# Patient Record
Sex: FEMALE | Race: WHITE | NOT HISPANIC OR LATINO | Employment: OTHER | URBAN - METROPOLITAN AREA
[De-identification: names, ages, dates, MRNs, and addresses within clinical notes are randomized per-mention and may not be internally consistent; named-entity substitution may affect disease eponyms.]

---

## 2017-08-01 ENCOUNTER — HOSPITAL ENCOUNTER (INPATIENT)
Facility: CLINIC | Age: 62
LOS: 5 days | Discharge: HOME | DRG: 580 | End: 2017-08-06
Attending: EMERGENCY MEDICINE | Admitting: INTERNAL MEDICINE
Payer: COMMERCIAL

## 2017-08-01 ENCOUNTER — APPOINTMENT (OUTPATIENT)
Dept: GENERAL RADIOLOGY | Facility: CLINIC | Age: 62
DRG: 580 | End: 2017-08-01
Attending: EMERGENCY MEDICINE
Payer: COMMERCIAL

## 2017-08-01 DIAGNOSIS — L03.818 CELLULITIS OF OTHER SPECIFIED SITE: Primary | ICD-10-CM

## 2017-08-01 DIAGNOSIS — S51.052A DOG BITE OF LEFT ELBOW, INITIAL ENCOUNTER: ICD-10-CM
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
## 2017-08-01 DIAGNOSIS — K59.00 CONSTIPATION, UNSPECIFIED CONSTIPATION TYPE: ICD-10-CM

## 2017-08-01 DIAGNOSIS — M00.9 INFECTION OF LEFT ELBOW (H): ICD-10-CM

## 2017-08-01 PROBLEM — L03.90 CELLULITIS: Status: ACTIVE | Noted: 2017-08-01

## 2017-08-01 LAB
ANION GAP SERPL CALCULATED.3IONS-SCNC: 9 MMOL/L (ref 3–14)
BASOPHILS # BLD AUTO: 0 10E9/L (ref 0–0.2)
BASOPHILS NFR BLD AUTO: 0.4 %
BUN SERPL-MCNC: 7 MG/DL (ref 7–30)
CALCIUM SERPL-MCNC: 9.2 MG/DL (ref 8.5–10.1)
CHLORIDE SERPL-SCNC: 91 MMOL/L (ref 94–109)
CO2 SERPL-SCNC: 25 MMOL/L (ref 20–32)
CREAT SERPL-MCNC: 0.6 MG/DL (ref 0.52–1.04)
CRP SERPL-MCNC: 156 MG/L (ref 0–8)
DIFFERENTIAL METHOD BLD: NORMAL
EOSINOPHIL # BLD AUTO: 0.1 10E9/L (ref 0–0.7)
EOSINOPHIL NFR BLD AUTO: 0.7 %
ERYTHROCYTE [DISTWIDTH] IN BLOOD BY AUTOMATED COUNT: 14.1 % (ref 10–15)
ERYTHROCYTE [SEDIMENTATION RATE] IN BLOOD BY WESTERGREN METHOD: 12 MM/H (ref 0–30)
GFR SERPL CREATININE-BSD FRML MDRD: ABNORMAL ML/MIN/1.7M2
GLUCOSE SERPL-MCNC: 92 MG/DL (ref 70–99)
HCT VFR BLD AUTO: 42 % (ref 35–47)
HGB BLD-MCNC: 14.2 G/DL (ref 11.7–15.7)
IMM GRANULOCYTES # BLD: 0.1 10E9/L (ref 0–0.4)
IMM GRANULOCYTES NFR BLD: 0.5 %
LYMPHOCYTES # BLD AUTO: 1.9 10E9/L (ref 0.8–5.3)
LYMPHOCYTES NFR BLD AUTO: 18.4 %
MCH RBC QN AUTO: 30.5 PG (ref 26.5–33)
MCHC RBC AUTO-ENTMCNC: 33.8 G/DL (ref 31.5–36.5)
MCV RBC AUTO: 90 FL (ref 78–100)
MONOCYTES # BLD AUTO: 0.7 10E9/L (ref 0–1.3)
MONOCYTES NFR BLD AUTO: 6.5 %
NEUTROPHILS # BLD AUTO: 7.8 10E9/L (ref 1.6–8.3)
NEUTROPHILS NFR BLD AUTO: 73.5 %
NRBC # BLD AUTO: 0 10*3/UL
NRBC BLD AUTO-RTO: 0 /100
PLATELET # BLD AUTO: 244 10E9/L (ref 150–450)
POTASSIUM SERPL-SCNC: 4.3 MMOL/L (ref 3.4–5.3)
RBC # BLD AUTO: 4.66 10E12/L (ref 3.8–5.2)
SODIUM SERPL-SCNC: 125 MMOL/L (ref 133–144)
WBC # BLD AUTO: 10.5 10E9/L (ref 4–11)

## 2017-08-01 PROCEDURE — 85652 RBC SED RATE AUTOMATED: CPT | Performed by: EMERGENCY MEDICINE

## 2017-08-01 PROCEDURE — 25000132 ZZH RX MED GY IP 250 OP 250 PS 637: Performed by: INTERNAL MEDICINE

## 2017-08-01 PROCEDURE — 96361 HYDRATE IV INFUSION ADD-ON: CPT

## 2017-08-01 PROCEDURE — 99207 ZZC CDG-MDM COMPONENT: MEETS LOW - DOWN CODED: CPT | Performed by: INTERNAL MEDICINE

## 2017-08-01 PROCEDURE — 87040 BLOOD CULTURE FOR BACTERIA: CPT | Performed by: EMERGENCY MEDICINE

## 2017-08-01 PROCEDURE — 25000128 H RX IP 250 OP 636: Performed by: INTERNAL MEDICINE

## 2017-08-01 PROCEDURE — 80048 BASIC METABOLIC PNL TOTAL CA: CPT | Performed by: EMERGENCY MEDICINE

## 2017-08-01 PROCEDURE — 86140 C-REACTIVE PROTEIN: CPT | Performed by: EMERGENCY MEDICINE

## 2017-08-01 PROCEDURE — 85025 COMPLETE CBC W/AUTO DIFF WBC: CPT | Performed by: EMERGENCY MEDICINE

## 2017-08-01 PROCEDURE — 25000125 ZZHC RX 250: Performed by: INTERNAL MEDICINE

## 2017-08-01 PROCEDURE — 96365 THER/PROPH/DIAG IV INF INIT: CPT

## 2017-08-01 PROCEDURE — 25000128 H RX IP 250 OP 636: Performed by: EMERGENCY MEDICINE

## 2017-08-01 PROCEDURE — 73080 X-RAY EXAM OF ELBOW: CPT | Mod: LT

## 2017-08-01 PROCEDURE — 96375 TX/PRO/DX INJ NEW DRUG ADDON: CPT

## 2017-08-01 PROCEDURE — 99285 EMERGENCY DEPT VISIT HI MDM: CPT | Mod: 25

## 2017-08-01 PROCEDURE — 12000011 ZZH R&B MS OVERFLOW

## 2017-08-01 PROCEDURE — 96376 TX/PRO/DX INJ SAME DRUG ADON: CPT

## 2017-08-01 PROCEDURE — 99222 1ST HOSP IP/OBS MODERATE 55: CPT | Mod: AI | Performed by: INTERNAL MEDICINE

## 2017-08-01 RX ORDER — HYDROMORPHONE HCL/0.9% NACL/PF 0.2MG/0.2
0.2 SYRINGE (ML) INTRAVENOUS
Status: DISCONTINUED | OUTPATIENT
Start: 2017-08-01 | End: 2017-08-03

## 2017-08-01 RX ORDER — HYDROCODONE BITARTRATE AND ACETAMINOPHEN 5; 325 MG/1; MG/1
1-2 TABLET ORAL EVERY 4 HOURS PRN
Status: ON HOLD | COMMUNITY
End: 2017-08-06

## 2017-08-01 RX ORDER — ACETAMINOPHEN 325 MG/1
650 TABLET ORAL EVERY 4 HOURS PRN
Status: DISCONTINUED | OUTPATIENT
Start: 2017-08-01 | End: 2017-08-03

## 2017-08-01 RX ORDER — PROCHLORPERAZINE 25 MG
25 SUPPOSITORY, RECTAL RECTAL EVERY 12 HOURS PRN
Status: DISCONTINUED | OUTPATIENT
Start: 2017-08-01 | End: 2017-08-03

## 2017-08-01 RX ORDER — GABAPENTIN 300 MG/1
300 CAPSULE ORAL 3 TIMES DAILY
Status: DISCONTINUED | OUTPATIENT
Start: 2017-08-01 | End: 2017-08-03

## 2017-08-01 RX ORDER — ONDANSETRON 4 MG/1
4 TABLET, ORALLY DISINTEGRATING ORAL EVERY 6 HOURS PRN
Status: DISCONTINUED | OUTPATIENT
Start: 2017-08-01 | End: 2017-08-03

## 2017-08-01 RX ORDER — FLUTICASONE PROPIONATE 50 MCG
1 SPRAY, SUSPENSION (ML) NASAL DAILY PRN
Status: DISCONTINUED | OUTPATIENT
Start: 2017-08-01 | End: 2017-08-03

## 2017-08-01 RX ORDER — MULTIVITAMIN,THERAPEUTIC
1 TABLET ORAL DAILY
Status: DISCONTINUED | OUTPATIENT
Start: 2017-08-01 | End: 2017-08-03

## 2017-08-01 RX ORDER — AMPICILLIN AND SULBACTAM 1; .5 G/1; G/1
1.5 INJECTION, POWDER, FOR SOLUTION INTRAMUSCULAR; INTRAVENOUS ONCE
Status: COMPLETED | OUTPATIENT
Start: 2017-08-01 | End: 2017-08-01

## 2017-08-01 RX ORDER — SERTRALINE HYDROCHLORIDE 100 MG/1
150 TABLET, FILM COATED ORAL DAILY
COMMUNITY

## 2017-08-01 RX ORDER — AMPICILLIN AND SULBACTAM 2; 1 G/1; G/1
3 INJECTION, POWDER, FOR SOLUTION INTRAMUSCULAR; INTRAVENOUS EVERY 6 HOURS
Status: DISCONTINUED | OUTPATIENT
Start: 2017-08-02 | End: 2017-08-03

## 2017-08-01 RX ORDER — HYDROMORPHONE HYDROCHLORIDE 1 MG/ML
0.5 INJECTION, SOLUTION INTRAMUSCULAR; INTRAVENOUS; SUBCUTANEOUS
Status: COMPLETED | OUTPATIENT
Start: 2017-08-01 | End: 2017-08-01

## 2017-08-01 RX ORDER — LISINOPRIL 10 MG/1
10 TABLET ORAL DAILY
Status: DISCONTINUED | OUTPATIENT
Start: 2017-08-02 | End: 2017-08-03

## 2017-08-01 RX ORDER — AMOXICILLIN 250 MG
1-2 CAPSULE ORAL 2 TIMES DAILY PRN
Status: DISCONTINUED | OUTPATIENT
Start: 2017-08-01 | End: 2017-08-03

## 2017-08-01 RX ORDER — BISACODYL 10 MG
10 SUPPOSITORY, RECTAL RECTAL DAILY PRN
Status: DISCONTINUED | OUTPATIENT
Start: 2017-08-01 | End: 2017-08-03

## 2017-08-01 RX ORDER — GABAPENTIN 300 MG/1
300 CAPSULE ORAL 3 TIMES DAILY
COMMUNITY

## 2017-08-01 RX ORDER — PROCHLORPERAZINE MALEATE 5 MG
5-10 TABLET ORAL EVERY 6 HOURS PRN
Status: DISCONTINUED | OUTPATIENT
Start: 2017-08-01 | End: 2017-08-03

## 2017-08-01 RX ORDER — NALOXONE HYDROCHLORIDE 0.4 MG/ML
.1-.4 INJECTION, SOLUTION INTRAMUSCULAR; INTRAVENOUS; SUBCUTANEOUS
Status: DISCONTINUED | OUTPATIENT
Start: 2017-08-01 | End: 2017-08-03

## 2017-08-01 RX ORDER — SODIUM CHLORIDE 9 MG/ML
1000 INJECTION, SOLUTION INTRAVENOUS CONTINUOUS
Status: DISCONTINUED | OUTPATIENT
Start: 2017-08-01 | End: 2017-08-01

## 2017-08-01 RX ORDER — NICOTINE 21 MG/24HR
1 PATCH, TRANSDERMAL 24 HOURS TRANSDERMAL DAILY
Status: DISCONTINUED | OUTPATIENT
Start: 2017-08-01 | End: 2017-08-03

## 2017-08-01 RX ORDER — OXYCODONE HYDROCHLORIDE 5 MG/1
5-10 TABLET ORAL
Status: DISCONTINUED | OUTPATIENT
Start: 2017-08-01 | End: 2017-08-03

## 2017-08-01 RX ORDER — FOLIC ACID 1 MG/1
1 TABLET ORAL DAILY
Status: DISCONTINUED | OUTPATIENT
Start: 2017-08-01 | End: 2017-08-03

## 2017-08-01 RX ORDER — ONDANSETRON 2 MG/ML
4 INJECTION INTRAMUSCULAR; INTRAVENOUS EVERY 6 HOURS PRN
Status: DISCONTINUED | OUTPATIENT
Start: 2017-08-01 | End: 2017-08-03

## 2017-08-01 RX ORDER — LANOLIN ALCOHOL/MO/W.PET/CERES
100 CREAM (GRAM) TOPICAL DAILY
Status: DISCONTINUED | OUTPATIENT
Start: 2017-08-01 | End: 2017-08-03

## 2017-08-01 RX ORDER — KETOROLAC TROMETHAMINE 15 MG/ML
15 INJECTION, SOLUTION INTRAMUSCULAR; INTRAVENOUS ONCE
Status: COMPLETED | OUTPATIENT
Start: 2017-08-01 | End: 2017-08-01

## 2017-08-01 RX ORDER — POLYETHYLENE GLYCOL 3350 17 G/17G
17 POWDER, FOR SOLUTION ORAL DAILY PRN
Status: DISCONTINUED | OUTPATIENT
Start: 2017-08-01 | End: 2017-08-03

## 2017-08-01 RX ORDER — FLUTICASONE PROPIONATE 50 MCG
1 SPRAY, SUSPENSION (ML) NASAL DAILY PRN
COMMUNITY
End: 2020-04-21

## 2017-08-01 RX ORDER — SODIUM CHLORIDE 9 MG/ML
INJECTION, SOLUTION INTRAVENOUS CONTINUOUS
Status: DISCONTINUED | OUTPATIENT
Start: 2017-08-01 | End: 2017-08-02

## 2017-08-01 RX ORDER — LISINOPRIL 10 MG/1
10 TABLET ORAL DAILY
COMMUNITY

## 2017-08-01 RX ADMIN — AMPICILLIN SODIUM AND SULBACTAM SODIUM 3 G: 2; 1 INJECTION, POWDER, FOR SOLUTION INTRAMUSCULAR; INTRAVENOUS at 23:42

## 2017-08-01 RX ADMIN — KETOROLAC TROMETHAMINE 15 MG: 15 INJECTION, SOLUTION INTRAMUSCULAR; INTRAVENOUS at 16:00

## 2017-08-01 RX ADMIN — AMPICILLIN SODIUM AND SULBACTAM SODIUM 1.5 G: 1; .5 INJECTION, POWDER, FOR SOLUTION INTRAMUSCULAR; INTRAVENOUS at 18:14

## 2017-08-01 RX ADMIN — NICOTINE 1 PATCH: 21 PATCH, EXTENDED RELEASE TRANSDERMAL at 19:51

## 2017-08-01 RX ADMIN — SODIUM CHLORIDE 1000 ML: 9 INJECTION, SOLUTION INTRAVENOUS at 15:59

## 2017-08-01 RX ADMIN — HYDROMORPHONE HYDROCHLORIDE 0.5 MG: 1 INJECTION, SOLUTION INTRAMUSCULAR; INTRAVENOUS; SUBCUTANEOUS at 18:14

## 2017-08-01 RX ADMIN — HYDROMORPHONE HYDROCHLORIDE 0.5 MG: 1 INJECTION, SOLUTION INTRAMUSCULAR; INTRAVENOUS; SUBCUTANEOUS at 16:00

## 2017-08-01 RX ADMIN — OXYCODONE HYDROCHLORIDE 10 MG: 5 TABLET ORAL at 19:52

## 2017-08-01 RX ADMIN — SODIUM CHLORIDE: 9 INJECTION, SOLUTION INTRAVENOUS at 19:05

## 2017-08-01 RX ADMIN — HYDROMORPHONE HYDROCHLORIDE 0.5 MG: 1 INJECTION, SOLUTION INTRAMUSCULAR; INTRAVENOUS; SUBCUTANEOUS at 16:45

## 2017-08-01 RX ADMIN — GABAPENTIN 300 MG: 300 CAPSULE ORAL at 19:50

## 2017-08-01 ASSESSMENT — ENCOUNTER SYMPTOMS
FEVER: 0
WOUND: 1
NAUSEA: 0
COLOR CHANGE: 1
VOMITING: 0

## 2017-08-01 NOTE — PHARMACY-ADMISSION MEDICATION HISTORY
Admission medication history interview status for this patient is complete. See [facility] admission navigator for allergy information, prior to admission medications and immunization status.     Medication history interview source(s):Patient  Medication history resources (including written lists, pill bottles, clinic record):None, used Care everywhere  Primary pharmacy:[facility]    Changes made to PTA medication list:  Added: flonase, gabapentin, lisinopril, zoloft,   Deleted: -  Changed: norco to 1-2 tabs q4h prn    Actions taken by pharmacist (provider contacted, etc):None     Additional medication history information:None    Medication reconciliation/reorder completed by provider prior to medication history? No    Do you take OTC medications (eg tylenol, ibuprofen, fish oil, eye/ear drops, etc)? No      For patients on insulin therapy: No   Lantus/levemir/NPH/Mix 70/30 dose:   (Y/N) (see below)   Sliding scale Novolog Y/N  If Yes, do you have a baseline novolog pre-meal dose:  units with meals   Patients eat three meals a day:   Y/N     Any Barriers to therapy:  cost of medications/comfortable with giving injections (if applicable)/ comfortable and confident with current diabetes regimen:       Prior to Admission medications    Medication Sig Last Dose Taking? Auth Provider   HYDROcodone-acetaminophen (NORCO) 5-325 MG per tablet Take 1-2 tablets by mouth every 4 hours as needed for moderate to severe pain  8/1/2017 at 0900 Yes Reported, Patient   fluticasone (FLONASE) 50 MCG/ACT spray Spray 1 spray into both nostrils daily as needed for rhinitis or allergies  Yes Unknown, Entered By History   gabapentin (NEURONTIN) 300 MG capsule Take 300 mg by mouth 3 times daily 8/1/2017 at x2 Yes Unknown, Entered By History   lisinopril (PRINIVIL/ZESTRIL) 10 MG tablet Take 10 mg by mouth daily 8/1/2017 at Unknown time Yes Unknown, Entered By History   sertraline (ZOLOFT) 100 MG tablet Take 150 mg by mouth daily . 1.5 tabs daily  8/1/2017 at Unknown time Yes Unknown, Entered By History   amoxicillin-clavulanate (AUGMENTIN) 875-125 MG per tablet Take 1 tablet by mouth 2 times daily For 7 days starting 7/30/17 8/1/2017 at x1 Yes Unknown, Entered By History

## 2017-08-01 NOTE — CONSULTS
Sarah Ulrich was seen in the ER.  She has some drainage from several puncture wounds in the left lateral elbow following a dog bite 72 hours ago.  She has pain free passive motion from  and full sup/pronation and only pain at the ends of the range.  She clearly has a soft tissue infection and cellulitis but I am not convinced she has a septic joint at this point.  It is reasonable to see how she responds to IV antibiotics.  If she worsens of fails to improve would consider I and D of the joint.  May eat for now, NPO after MN..  Roldan Stewart MD, Orthopedic Surgeon, [phone]

## 2017-08-01 NOTE — ED PROVIDER NOTES
"  History     Chief Complaint:  Dog bite    HPI   Sarah Ulrich is a 61 year old, right hand dominant and last tetanus two years ago, female who presents with evaluation for increased redness, pain and swelling from a dog bite over the left elbow.  On 7/29/17 at about 1900, the patient was patting her son's girlfriends dog when the dog suddenly bit her left elbow. She was not able to be seen that day but was seen in urgent care on 7/30/17. Her wounded was cleaned and received antibiotics. No sutures were performed. She was prescribed Augmentin and Norco. Yesterday morning, she noticed that her left elbow has been swelling and an increase redness radiating towards her hand. She states that her elbow felt like \"it has a fever and was very warm\". Otherwise, she denies any fever, nausea, or vomiting. She thinks the dog is up to date with all shots and will confirm with her son's girlfriend.     Imaging results from [facility], July 2017:   XR Left Elbow:   The soft tissue swelling with joint effusion.  No acute fracture.    XR Lumbar Spine:   1. No acute abnormality.  2. Prominent multilevel degenerative disc changes with associated lumbar scoliosis, convex to the right and centered at L3-4.    XR Mammo Bilat:  BI-RADS CATEGORY: 1 -  NEGATIVE.    XR Right Wrist:   Advanced degenerative osteoarthritic changes.  No acute abnormality.    Allergies:  No Known Drug Allergies     Medications:    Norco     Past Medical History:    The patient denies any relevant past medical history.    Past Surgical History:    Biopsy      Family History:    No past pertinent family history.    Social History:  The patient presented to the ED alone.  Smoking Status: No  Smokeless Tobacco: No  Alcohol Use: No  Marital Status:   [2]     Review of Systems   Constitutional: Negative for fever.   Gastrointestinal: Negative for nausea and vomiting.   Skin: Positive for color change (Redness on left elbow) and wound.        Swelling on left elbow " "  All other systems reviewed and are negative.    Physical Exam   Vitals:  Patient Vitals for the past 24 hrs:   BP Temp Temp src Pulse Resp SpO2 Height Weight   08/01/17 1700 123/66 - - - - 96 % - -   08/01/17 1630 136/71 - - - - 98 % - -   08/01/17 1615 127/67 - - - - - - -   08/01/17 1600 132/68 - - - - 97 % - -   08/01/17 1511 136/70 99.3  F (37.4  C) Temporal 101 18 100 % 1.575 m (5' 2\") 48.1 kg (106 lb)     Physical Exam  Constitutional:  Well developed, Well nourished, Completely comfortable    HENT:  Bilateral external ears normal, Mucous membranes moist, Nose normal. Neck- Normal range of motion, Supple  Respiratory:  Normal breath sounds, No respiratory distress, No wheezing,  Cardiovascular:  Normal heart rate, Normal rhythm, No murmurs,    GI:  Bowel sounds normal, Soft, No tenderness,   Musculoskeletal:  Intact distal pulses, No edema, grossly unremarkable range of motion. Left elbow diffuse swelling and flextion fluctuate secondary to pain. Multiple puncture wounds to elbow, at least 1cm long, extend all the way through skin. Arrhythmia elbow to distal form. Erythema elbow to distal form. Radial pulse, distal strength and sensation in tact.   Integument:  Warm, Dry   Neurologic:  Alert, attentive and appropriately oriented  Psychiatric:  Mood and affect normal.           Emergency Department Course     Imaging:  Radiology findings were communicated with the patient who voiced understanding of the findings.    Left Elbow XR   There is an elbow joint effusion. There is associated soft tissue swelling posteriorly. No fractures or radiopaque foreign bodies are identified on these views. Given the clinical history the joint effusion could possibly be related to an infection. Another possibility is that the patient also had some trauma and there could be an occult fracture.  Reading per radiology.    Laboratory:  Laboratory findings were communicated with the patient who voiced understanding of the " findings.  CBC: AWNL (WBC 10.5, HGB 14.2, )  BMP: NA: 125 (L), Chloride: 91 (L), o/w WNL. (Creatnine: 0.60)  Erythrocyte: 12  CRP inflammation: 156.0 (H)  Blood culture: Pending     Interventions:  1600 Toradol 15 mg IV  1814 Dilaudid 0.5 mg IV  1827 Unasyn 100 mL IV    Emergency Department Course:  Nursing notes and vitals reviewed.  I performed an exam of the patient as documented above.   IV was inserted and blood was drawn for laboratory testing, results above.  The patient was sent for a Left Elbow XR while in the emergency department, results above.  IV was inserted and blood was drawn for laboratory testing, results above.    5:23 PM: I spoke with Dr. Stewart ([facility]) regarding patient's presentation, findings, and plan of care.  He will see the patient, then will decide on any plan of operative intervention for possible septic joint    6:29 PM: I spoke with Dr. Rutledge from Hospital[facility] regarding patient's presentation, findings, and plan of care.    I discussed the treatment plan with the patient. They expressed understanding of this plan and consented to admission. I discussed the patient with Dr. Rutledge, who will admit the patient to a monitored bed for further evaluation and treatment.    I personally reviewed the laboratory results with the Patient and answered all related questions prior to admission.    Impression & Plan      Medical Decision Making:  Saarh Ulrich is a 61 year old female who presents for evaluation of a dog bite. The workup here in the ED shows signs of cellulitis and is concerning for possible early septic joint.  No repairable  lacerations, tendon or bone injury.  No signs of foreign body or dog teeth in wound.  Dog is known so will have them observe for signs of rabies; no rabies shots indicated from ED. because of the development of worsening cellulitis despite antibiotics, and the possibility of joint involvement, patient was admitted for further evaluation and treatment.  She  agrees with plan.    Diagnosis:    ICD-10-CM    1. Infection of left elbow (H) M00.9 Blood culture     Blood culture   2. Dog bite of left elbow, initial encounter S51.052A     W54.0XXA      Disposition:  Admission    Scribe Disclosure:  I, Reina Siu, am serving as a scribe at 3:24 PM on 8/1/2017 to document services personally performed by Nicolasa Virk MD, based on my observations and the provider's statements to me.    8/1/2017   [facility] EMERGENCY DEPARTMENT     Nicolasa Virk MD  08/01/17 2226

## 2017-08-01 NOTE — IP AVS SNAPSHOT
[facility]    [address]    [facility] [address]    Phone:  [phone]    Fax:  [phone]                                       After Visit Summary   8/1/2017    Sarah Ulrich    MRN: [id number]           After Visit Summary Signature Page     I have received my discharge instructions, and my questions have been answered. I have discussed any challenges I see with this plan with the nurse or doctor.    ..........................................................................................................................................  Patient/Patient Representative Signature      ..........................................................................................................................................  Patient Representative Print Name and Relationship to Patient    ..................................................               ................................................  Date                                            Time    ..........................................................................................................................................  Reviewed by Signature/Title    ...................................................              ..............................................  Date                                                            Time

## 2017-08-01 NOTE — IP AVS SNAPSHOT
MRN:[id number]                      After Visit Summary   8/1/2017    Sarah Ulrich    MRN: [id number]           Thank you!     Thank you for choosing [facility] for your care. Our goal is always to provide you with excellent care. Hearing back from our patients is one way we can continue to improve our services. Please take a few minutes to complete the written survey that you may receive in the mail after you visit. If you would like to speak to someone directly about your visit please contact Patient Relations at [phone]. Thank you!          Patient Information     Date Of Birth          1955        Designated Caregiver       Most Recent Value    Caregiver    Will someone help with your care after discharge? yes    Name of designated caregiver Jordan    Phone number of caregiver [phone]    Caregiver address [place]      About your hospital stay     You were admitted on:  August 1, 2017 You last received care in the:  [facility]    You were discharged on:  August 6, 2017        Reason for your hospital stay       Dog bite to left elbow causing cellulitis, questionable septic joint. S/p left elbow arthroscopy, I&D, and labs drawn                  Who to Call     For medical emergencies, please call 911.  For non-urgent questions about your medical care, please call your primary care provider or clinic, [phone]  For questions related to your surgery, please call your surgery clinic        Attending Provider     Provider Specialty    Nicolasa Virk MD Emergency Medicine    Bam Silva,  Internal Medicine       Primary Care Provider Office Phone # Fax #    [facility] [place] [facility] [phone]      After Care Instructions     Activity       Your activity upon discharge: May move the elbow, hand, and wrist as tolerated according to pain. Dressings per wound care nurse instructions            Diet       Follow this diet upon  discharge: Regular            Supplies       List the supplies the pt needs to go home.  Wound care supplies as indicated            Wound care and dressings       Instructions to care for your wound at home: ice to area for comfort and dressing changes daily according to wound care nurse instructions.                  Follow-up Appointments     Follow-up and recommended labs and tests        Follow up with Mary Corey PA-C at [facility] next Thursday, 8/10. Call [phone] and ask for Stef MURPHY (with Dr. Stewart) to make appointment.                  Further instructions from your care team        Wound care 2 TIMES DAILY     Comments: Plan of care for wound located on Left Elbow:   1. Remove old dressing spraying with microklenz or water to prevent sticking.   2. Flush wound with microklenz spray and allow it to sit in wound bed for 3 to 5mins.   3. Take a strip of gauze and wick out any extra microklenz.   4. Loosely pack wound with Iodoform strip gauze. Fluff strip gauze into wound bed ensuring not to pack tight to allow new healthy tissue to grow. Approx 4inches for bottom wound and 7inches for top  Wound. As wound heals apply less strip gauze. Ensure a tail of gauze is left outside wound to prevent gauze from slipping into wound and to help wick drainage out.   5. Cover with vaseline gauze to prevent sticking   6. Cover with absorbant dressing such as 4x4 or ABD depending on drainage.   7. Secure with kerlix and tape.   8. Apply ace wrap from wrist to upper bicep, ensuring it is not wrapped to tight, checking for color, motion, sensitivity in hand.   9. Change dressing 2x/day and as needed.            Pending Results     Date and Time Order Name Status Description    8/3/2017 1442 Anaerobic bacterial culture Preliminary     8/3/2017 1441 Fluid Culture Aerobic Bacterial Preliminary     8/3/2017 1439 Anaerobic bacterial culture Preliminary     8/1/2017 1535 Blood culture Preliminary     8/1/2017  "1533 Blood culture Preliminary             Statement of Approval     Ordered          17 0912  I have reviewed and agree with all the recommendations and orders detailed in this document.  EFFECTIVE NOW     Approved and electronically signed by:  Crow Marquez MD             Admission Information     Date & Time Provider Department Dept. Phone    2017 Bma Silva, DO [facility] [phone]      Your Vitals Were     Blood Pressure Pulse Temperature Respirations Height Weight    149/81 80 98.3  F (36.8  C) (Oral) 16 1.575 m (5' 2\") 49.1 kg (108 lb 4.8 oz)    Pulse Oximetry BMI (Body Mass Index)                94% 19.81 kg/m2          MyChart Information     [employer] lets you send messages to your doctor, view your test results, renew your prescriptions, schedule appointments and more. To sign up, go to [email or web address] . Click on \"Log in\" on the left side of the screen, which will take you to the Welcome page. Then click on \"Sign up Now\" on the right side of the page.     You will be asked to enter the access code listed below, as well as some personal information. Please follow the directions to create your username and password.     Your access code is: [email or web address]  Expires: 10/30/2017  3:32 PM     Your access code will  in 90 days. If you need help or a new code, please call your [facility] clinic or [phone].        Care EveryWhere ID     This is your Care EveryWhere ID. This could be used by other organizations to access your [facility] medical records  [id number]        Equal Access to Services     [facility]: Hadii john germain [place], waaxda luqadaha, qaybta kaalmada yeny contreras. So [facility] [phone].    ATENCIÓN: Si habla español, tiene a husain disposición servicios gratuitos de asistencia lingüística. Llame al [phone].    We comply with applicable federal civil rights laws and [place] laws. We do not " discriminate on the basis of race, color, national origin, age, disability sex, sexual orientation or gender identity.               Review of your medicines      START taking        Dose / Directions    acetaminophen 325 MG tablet   Commonly known as:  TYLENOL   Used for:  Infection of left elbow (H)        Dose:  650 mg   Take 2 tablets (650 mg) by mouth every 4 hours as needed for other (surgical pain)   Quantity:  100 tablet   Refills:  0       hydrOXYzine 25 MG tablet   Commonly known as:  ATARAX   Used for:  Infection of left elbow (H)        Dose:  25 mg   Take 1 tablet (25 mg) by mouth every 6 hours as needed for itching   Quantity:  30 tablet   Refills:  0       oxyCODONE 5 MG IR tablet   Commonly known as:  ROXICODONE   Used for:  Infection of left elbow (H)        Dose:  5-10 mg   Take 1-2 tablets (5-10 mg) by mouth every 4 hours as needed for moderate to severe pain   Quantity:  40 tablet   Refills:  0       polyethylene glycol Packet   Commonly known as:  MIRALAX/GLYCOLAX   Used for:  Constipation, unspecified constipation type   Notes to Patient:  Start if no longer having loose stools.        Dose:  1 packet   Take 17 g by mouth daily for 24 days   Quantity:  24 packet   Refills:  0         CONTINUE these medicines which may have CHANGED, or have new prescriptions. If we are uncertain of the size of tablets/capsules you have at home, strength may be listed as something that might have changed.        Dose / Directions    amoxicillin-clavulanate 875-125 MG per tablet   Commonly known as:  AUGMENTIN   This may have changed:  additional instructions   Used for:  Dog bite of left elbow, initial encounter, Infection of left elbow (H)   Notes to Patient:  Take one dose today, then start with 2 doses morning and evening tomorrow.        Dose:  1 tablet   Take 1 tablet by mouth 2 times daily for 10 days   Quantity:  20 tablet   Refills:  0         CONTINUE these medicines which have NOT CHANGED        Dose /  Directions    fluticasone 50 MCG/ACT spray   Commonly known as:  FLONASE        Dose:  1 spray   Spray 1 spray into both nostrils daily as needed for rhinitis or allergies   Refills:  0       gabapentin 300 MG capsule   Commonly known as:  NEURONTIN        Dose:  300 mg   Take 300 mg by mouth 3 times daily   Refills:  0       lisinopril 10 MG tablet   Commonly known as:  PRINIVIL/ZESTRIL        Dose:  10 mg   Take 10 mg by mouth daily   Refills:  0       sertraline 100 MG tablet   Commonly known as:  ZOLOFT        Dose:  150 mg   Take 150 mg by mouth daily . 1.5 tabs daily   Refills:  0         STOP taking     HYDROcodone-acetaminophen 5-325 MG per tablet   Commonly known as:  NORCO                Where to get your medicines      These medications were sent to [place], [place] - [address] [facility]  [address] [facility] [address]     Phone:  [phone]     acetaminophen 325 MG tablet    amoxicillin-clavulanate 875-125 MG per tablet    hydrOXYzine 25 MG tablet    polyethylene glycol Packet         Some of these will need a paper prescription and others can be bought over the counter. Ask your nurse if you have questions.     Bring a paper prescription for each of these medications     oxyCODONE 5 MG IR tablet                Protect others around you: Learn how to safely use, store and throw away your medicines at www.disposemymeds.org.             Medication List: This is a list of all your medications and when to take them. Check marks below indicate your daily home schedule. Keep this list as a reference.      Medications           Morning Afternoon Evening Bedtime As Needed    acetaminophen 325 MG tablet   Commonly known as:  TYLENOL   Take 2 tablets (650 mg) by mouth every 4 hours as needed for other (surgical pain)   Last time this was given:  975 mg on 8/6/2017  7:39 AM   Next Dose Due:  3:00 pm                                amoxicillin-clavulanate 875-125 MG per  tablet   Commonly known as:  AUGMENTIN   Take 1 tablet by mouth 2 times daily for 10 days   Next Dose Due:  Evening   Notes to Patient:  Take one dose today, then start with 2 doses morning and evening tomorrow.                                fluticasone 50 MCG/ACT spray   Commonly known as:  FLONASE   Spray 1 spray into both nostrils daily as needed for rhinitis or allergies                                gabapentin 300 MG capsule   Commonly known as:  NEURONTIN   Take 300 mg by mouth 3 times daily   Last time this was given:  300 mg on 8/6/2017  7:55 AM                                hydrOXYzine 25 MG tablet   Commonly known as:  ATARAX   Take 1 tablet (25 mg) by mouth every 6 hours as needed for itching   Last time this was given:  25 mg on 8/6/2017  7:55 AM   Next Dose Due:  2:00 pm if needed                                lisinopril 10 MG tablet   Commonly known as:  PRINIVIL/ZESTRIL   Take 10 mg by mouth daily   Last time this was given:  10 mg on 8/6/2017  7:54 AM                                oxyCODONE 5 MG IR tablet   Commonly known as:  ROXICODONE   Take 1-2 tablets (5-10 mg) by mouth every 4 hours as needed for moderate to severe pain   Last time this was given:  10 mg on 8/6/2017 11:04 AM   Next Dose Due:  3:00 pm if needed                                polyethylene glycol Packet   Commonly known as:  MIRALAX/GLYCOLAX   Take 17 g by mouth daily for 24 days   Notes to Patient:  Start if no longer having loose stools.                                sertraline 100 MG tablet   Commonly known as:  ZOLOFT   Take 150 mg by mouth daily . 1.5 tabs daily   Last time this was given:  150 mg on 8/6/2017  7:55 AM

## 2017-08-01 NOTE — H&P
[facility]  Hospitalist Admission Note    Name: Sarah Ulrich      MRN: [id number]  YOB: 1955    Age: 61 year old  Date of admission: 8/1/2017  Primary care provider: Elton, Truong Gonzales            Assessment and Plan:   Sarah Ulrich is a 61 year old female with a history of hypertension who presents with left arm cellulitis.    1. Left arm dog bite cellulitis.  Continue IV Unasyn.  Orthopedic surgery consult.  Pain meds PRN.    2.  Tobacco use disorder.  Advised patient to quit smoking.  Nicotine patch.    3.  HTN.  Lisinopril.    4.  Depression.  Restart Zoloft.    5.  Hyponatremia.  IV fluids overnight.  Recheck BMP tomorrow.    6.  Alcohol overuse.  Start Folic acid, Thiamine, and Multi vitamin.  If she develops signs of withdrawal, start Withdrawal protocol.      Code status: Full  Admit to Inpatient  Prophylaxis: PCD's.            Chief Complaint:   Left arm dog bite.         History of Present Illness:   Sarah Ulrich is a 61 year old female who presents with dog bite of left arm.  Dog bit her 3 days ago.  Immediately having pain.  Pain medications at home have not been very helpful.  Pain medications in emergency room had at least helped to bring the pain under better control.  She began having redness around the site of dog bite the next day.  She did present to outside urgent care two days ago.  She was started on Augmentin.  Despite Augmentin over the past two days, redness has been spreading.  She has not had anything like this happen before.  She does state that she had her last tetanus shot two years ago.  She has not noticed any fevers or chills.  There has been occasional drainage from puncture wounds around the left elbow.  No other complaints.          Past Medical History:   Hypertension.  Skin Cancer.  Depression.  Tobacco use disorder.        Past Surgical History:     Past Surgical History:   Procedure Laterality Date     BIOPSY               Social History:  "  Smokes 1 ppd  Drinks 4-6 beers daily.        Family History:   Mother with CHF.  Multiple members of extended family with breast cancer.         Allergies:   No Known Allergies          Medications:     Prior to Admission medications    Medication Sig Last Dose Taking? Auth Provider   HYDROcodone-acetaminophen (NORCO) 5-325 MG per tablet Take 1-2 tablets by mouth every 4 hours as needed for moderate to severe pain  8/1/2017 at 0900 Yes Reported, Patient   fluticasone (FLONASE) 50 MCG/ACT spray Spray 1 spray into both nostrils daily as needed for rhinitis or allergies  Yes Unknown, Entered By History   gabapentin (NEURONTIN) 300 MG capsule Take 300 mg by mouth 3 times daily 8/1/2017 at x2 Yes Unknown, Entered By History   lisinopril (PRINIVIL/ZESTRIL) 10 MG tablet Take 10 mg by mouth daily 8/1/2017 at Unknown time Yes Unknown, Entered By History   sertraline (ZOLOFT) 100 MG tablet Take 150 mg by mouth daily . 1.5 tabs daily 8/1/2017 at Unknown time Yes Unknown, Entered By History   amoxicillin-clavulanate (AUGMENTIN) 875-125 MG per tablet Take 1 tablet by mouth 2 times daily For 7 days starting 7/30/17 8/1/2017 at x1 Yes Unknown, Entered By History             Review of Systems:   A Comprehensive greater than 10 system review of systems was carried out.  Pertinent positives and negatives are noted above.  Otherwise negative for contributory information.           Physical Exam:   Blood pressure 128/77, pulse 101, temperature 99.3  F (37.4  C), temperature source Temporal, resp. rate 18, height 1.575 m (5' 2\"), weight 48.1 kg (106 lb), SpO2 97 %.  Wt Readings from Last 1 Encounters:   08/01/17 48.1 kg (106 lb)     Exam:  GENERAL: No apparent distress. Awake, alert, and fully oriented.  HEENT: Normocephalic, atraumatic. Extraocular movements intact.  CARDIOVASCULAR: Regular rate and rhythm without murmurs or rubs. No S3.  PULMONARY: Clear to auscultation bilaterally.  ABDOMINAL: Soft, non-tender, non-distended. Bowel " sounds normoactive.   EXTREMITIES: No cyanosis or clubbing. No appreciable edema.  NEUROLOGICAL: CN 2-12 grossly intact, no focal neurological deficits.  DERMATOLOGICAL: Extensive left arm erythema with multiple puncture wounds near left elbow.          Data:       Laboratory:    Recent Labs  Lab 08/01/17  1547   WBC 10.5   HGB 14.2   HCT 42.0   MCV 90          Recent Labs  Lab 08/01/17  1547   *   POTASSIUM 4.3   CHLORIDE 91*   CO2 25   ANIONGAP 9   GLC 92   BUN 7   CR 0.60   GFRESTIMATED >90Non  GFR Calc   GFRESTBLACK >90African American GFR Calc   SCOTT 9.2     No results for input(s): CULT in the last 168 hours.    Imaging:  Recent Results (from the past 24 hour(s))   Elbow  XR, G/E 3 views, left    Narrative    ELBOW LEFT THREE OR MORE VIEWS   8/1/2017 5:01 PM     HISTORY: Dog bite, swelling.    COMPARISON: None.      Impression    IMPRESSION: There is an elbow joint effusion. There is associated soft  tissue swelling posteriorly. No fractures or radiopaque foreign bodies  are identified on these views. Given the clinical history the joint  effusion could possibly be related to an infection. Another  possibility is that the patient also had some trauma and there could  be an occult fracture.    LUKE CASE MD

## 2017-08-01 NOTE — ED NOTES
[facility]  ED Nurse Handoff Report    Sarah Ulrich is a 61 year old female   ED Chief complaint: Dog Bite  . ED Diagnosis:   Final diagnoses:   Infection of left elbow (H)   Dog bite of left elbow, initial encounter     Allergies: No Known Allergies    Code Status: Full Code  Activity level - Baseline/Home:  Independent. Activity Level - Current:   Independent. Lift room needed: No. Bariatric: No   Needed: No   Isolation: No. Infection: Not Applicable.     Vital Signs:   Vitals:    08/01/17 1715 08/01/17 1730 08/01/17 1745 08/01/17 1800   BP: 134/70 (!) 109/92 131/79 135/84   Pulse:       Resp:       Temp:       TempSrc:       SpO2: 94% 97% 95% 97%   Weight:       Height:           Cardiac Rhythm:  ,      Pain level: 0-10 Pain Scale: 9  Patient confused: No. Patient Falls Risk: Yes.   Elimination Status: Has not needed to use bathroom while in ED.   Patient Report - Initial Complaint: dog bite, increased pain. Focused Assessment: Left elbow swelling, warmth, oozing, pain, from dog bite a few days prior, oral antibiotics at home.  Tests Performed: labs, xray. Abnormal Results: elevated CRP.   Treatments provided: NS, Dilaudid prn, Unasyn IV.  Family Comments: N/A.  OBS brochure/video discussed/provided to patient:  N/A  ED Medications:   Medications   0.9% sodium chloride BOLUS (0 mLs Intravenous Stopped 8/1/17 1704)     Followed by   0.9% sodium chloride infusion (not administered)   ampicillin-sulbactam (UNASYN) 1.5 g vial to attach  mL bag (1.5 g Intravenous New Bag 8/1/17 1814)   ketorolac (TORADOL) injection 15 mg (15 mg Intravenous Given 8/1/17 1600)   HYDROmorphone (PF) (DILAUDID) injection 0.5 mg (0.5 mg Intravenous Given 8/1/17 1814)     Drips infusing:  Yes  For the majority of the shift this patient was Green. Interventions performed were N/A.     Severe Sepsis OR Septic Shock Diagnosis Present: No      ED Nurse Name/Phone Number: Kasey AMINA Barnett,   6:17 PM    Acknowledged:  Violet Ulloa RN 8/1/2017 1827

## 2017-08-02 LAB
ANION GAP SERPL CALCULATED.3IONS-SCNC: 7 MMOL/L (ref 3–14)
BUN SERPL-MCNC: 6 MG/DL (ref 7–30)
CALCIUM SERPL-MCNC: 8.1 MG/DL (ref 8.5–10.1)
CHLORIDE SERPL-SCNC: 104 MMOL/L (ref 94–109)
CO2 SERPL-SCNC: 25 MMOL/L (ref 20–32)
CREAT SERPL-MCNC: 0.4 MG/DL (ref 0.52–1.04)
GFR SERPL CREATININE-BSD FRML MDRD: ABNORMAL ML/MIN/1.7M2
GLUCOSE SERPL-MCNC: 94 MG/DL (ref 70–99)
POTASSIUM SERPL-SCNC: 3.9 MMOL/L (ref 3.4–5.3)
SODIUM SERPL-SCNC: 136 MMOL/L (ref 133–144)

## 2017-08-02 PROCEDURE — 36415 COLL VENOUS BLD VENIPUNCTURE: CPT | Performed by: INTERNAL MEDICINE

## 2017-08-02 PROCEDURE — 25000128 H RX IP 250 OP 636: Performed by: INTERNAL MEDICINE

## 2017-08-02 PROCEDURE — 80048 BASIC METABOLIC PNL TOTAL CA: CPT | Performed by: INTERNAL MEDICINE

## 2017-08-02 PROCEDURE — 12000011 ZZH R&B MS OVERFLOW

## 2017-08-02 PROCEDURE — 99232 SBSQ HOSP IP/OBS MODERATE 35: CPT | Performed by: INTERNAL MEDICINE

## 2017-08-02 PROCEDURE — 25000125 ZZHC RX 250: Performed by: INTERNAL MEDICINE

## 2017-08-02 PROCEDURE — 25000132 ZZH RX MED GY IP 250 OP 250 PS 637: Performed by: INTERNAL MEDICINE

## 2017-08-02 RX ADMIN — OXYCODONE HYDROCHLORIDE 10 MG: 5 TABLET ORAL at 11:30

## 2017-08-02 RX ADMIN — THERA TABS 1 TABLET: TAB at 08:07

## 2017-08-02 RX ADMIN — FOLIC ACID 1 MG: 1 TABLET ORAL at 08:07

## 2017-08-02 RX ADMIN — AMPICILLIN SODIUM AND SULBACTAM SODIUM 3 G: 2; 1 INJECTION, POWDER, FOR SOLUTION INTRAMUSCULAR; INTRAVENOUS at 05:42

## 2017-08-02 RX ADMIN — GABAPENTIN 300 MG: 300 CAPSULE ORAL at 13:45

## 2017-08-02 RX ADMIN — GABAPENTIN 300 MG: 300 CAPSULE ORAL at 20:25

## 2017-08-02 RX ADMIN — OXYCODONE HYDROCHLORIDE 10 MG: 5 TABLET ORAL at 22:51

## 2017-08-02 RX ADMIN — AMPICILLIN SODIUM AND SULBACTAM SODIUM 3 G: 2; 1 INJECTION, POWDER, FOR SOLUTION INTRAMUSCULAR; INTRAVENOUS at 11:30

## 2017-08-02 RX ADMIN — AMPICILLIN SODIUM AND SULBACTAM SODIUM 3 G: 2; 1 INJECTION, POWDER, FOR SOLUTION INTRAMUSCULAR; INTRAVENOUS at 18:36

## 2017-08-02 RX ADMIN — NICOTINE 1 PATCH: 21 PATCH, EXTENDED RELEASE TRANSDERMAL at 08:05

## 2017-08-02 RX ADMIN — Medication 1 MG: at 22:51

## 2017-08-02 RX ADMIN — SERTRALINE HYDROCHLORIDE 150 MG: 100 TABLET ORAL at 08:07

## 2017-08-02 RX ADMIN — OXYCODONE HYDROCHLORIDE 10 MG: 5 TABLET ORAL at 18:35

## 2017-08-02 RX ADMIN — SODIUM CHLORIDE: 9 INJECTION, SOLUTION INTRAVENOUS at 05:42

## 2017-08-02 RX ADMIN — OXYCODONE HYDROCHLORIDE 10 MG: 5 TABLET ORAL at 08:06

## 2017-08-02 RX ADMIN — LISINOPRIL 10 MG: 10 TABLET ORAL at 08:07

## 2017-08-02 RX ADMIN — ACETAMINOPHEN 650 MG: 325 TABLET, FILM COATED ORAL at 08:07

## 2017-08-02 RX ADMIN — OXYCODONE HYDROCHLORIDE 10 MG: 5 TABLET ORAL at 02:50

## 2017-08-02 RX ADMIN — AMPICILLIN SODIUM AND SULBACTAM SODIUM 3 G: 2; 1 INJECTION, POWDER, FOR SOLUTION INTRAMUSCULAR; INTRAVENOUS at 23:57

## 2017-08-02 RX ADMIN — GABAPENTIN 300 MG: 300 CAPSULE ORAL at 08:07

## 2017-08-02 RX ADMIN — OXYCODONE HYDROCHLORIDE 10 MG: 5 TABLET ORAL at 14:46

## 2017-08-02 RX ADMIN — Medication 100 MG: at 08:07

## 2017-08-02 ASSESSMENT — PAIN DESCRIPTION - DESCRIPTORS
DESCRIPTORS: THROBBING
DESCRIPTORS: THROBBING

## 2017-08-02 NOTE — PROGRESS NOTES
"Orthopedic Surgery  8/2/2017    S: Patient voices no complaints today.     O: Blood pressure 119/69, pulse 80, temperature 97.6  F (36.4  C), temperature source Oral, resp. rate 17, height 1.575 m (5' 2\"), weight 49.1 kg (108 lb 4.8 oz), SpO2 94 %.  Lab Results   Component Value Date    HGB 14.2 08/01/2017     No results found for: INR     Neurovascularly intact.  AROM of the left elbow   The wound is are draining minimally, mainly serous fluid  The wound looks good with localized erythema of the surrounding skin.    A: Ms. Ulrich is doing well following a dog bite.  At this point it appears to be a local soft tissue infection, but we will continue to monitor.    P: Continue antibiotic therapy.   Pain management  Discharge planning    Paulo Brown  [phone]    "

## 2017-08-02 NOTE — PLAN OF CARE
Problem: Goal Outcome Summary  Goal: Goal Outcome Summary  Outcome: No Change  AO, VSS. Pain managed by prn medication. Dressing cdi.

## 2017-08-02 NOTE — PLAN OF CARE
Problem: Goal Outcome Summary  Goal: Goal Outcome Summary  Outcome: No Change  Afebrile. Sats dropped to 85% at beginning of shift, applied NC at 2 LPM, satting above 90% since then. Diminished LS/shallow breathing, infrequent cough, nicotine patch in place. CIWA score 0. Left elbow is swollen/pink, warm to touch, puncture wounds draining small amounts of serosanguinous fluid. Rating LUE pain 5-6/10, received Oxycodone x1. PIV patent and infusing. Continuing scheduled unasyn. NPO for possible I&D today, voided x2 overnight. BC pending. Acting appropriately with staff. Will continue to monitor and provide for needs.

## 2017-08-02 NOTE — CONSULTS
Requesting Physician:  Bam Desai    REASON FOR CONSULT:  Dog bite wound and infection left lateral elbow.    HISTORY OF PRESENT ILLNESS:  Sarah Ulrich is a pleasant 61-year-old healthy woman.  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  She states she was bit by large [place] dog on Saturday, so that was 72 hours ago.  This happened on the lateral aspect of her elbow.  She developed increasing pain and redness over the lateral aspect of her arm and came to emergency room today, was evaluated and felt that this infection required IV antibiotics.  She was admitted and we were consulted.  I saw her in the emergency room.  She states that she has pain over the lateral aspect of the elbow.  She denies taking her temperature or having a fever.  Denies previous elbow problems.  She denies neurologic or radicular symptoms.    PAST MEDICAL HISTORY:  Significant for high blood pressure.  She takes lisinopril.    PAST SURGICAL HISTORY:  Denies.    SOCIAL HISTORY:  She is .  She is a smoker.    REVIEW OF SYSTEMS:  Negative for all 10 categories except those noted in the history of present illness.      Physical Examination:    General:  Sarah is alert and oriented x3.  Her mood and affect were appropriate.  She appears stated age.  She is lying comfortably on the hospital cart.  She is 5 feet 2 inches tall, weighs 40 kg for a BMI of 19.4.    Vital Signs:  Temperature was 99.3, heart rate was 101, respirations are 18, blood pressure was 134/70, with O2 saturation 94% on room air.    HEENT:  Was unremarkable.    Neck:  Was supple, nontender, full range of motion.    Extremities:  Examination of her right upper extremity was unremarkable while her left upper extremity demonstrated erythema over the lateral aspect and posterior aspect of the elbow radiating down toward the wrist she had 4 puncture type wounds from obviously large canine teeth over the lateral aspect of her elbow, 1 just distal to the radial  capitellar joint, 1 proximal to the joint near the triceps.  Two of them were more superficial.  There did not appear to be any fluctuance.  She was draining serosanguineous fluid out of 2 of these.  Her range of motion was from 10 to 110 degrees of flexion.  She had no pain with mid range of motion, only at the very ends of the motion.  She had full supination and pronation.    Neurologic:  Exam was normal.    LABORATORY VALUES:  Demonstrated white blood cell count of 10.5, hemoglobin of 14, platelets 244,000.  Her C-reactive protein was 156 while the sed rate was 12.  The rest of her electrolyte panel was essentially normal.  Her sodium was slightly low at 125.  Glucose was 92.  Elbow x-rays were reviewed by myself including AP and lateral.  They  demonstrated a small anterior fat pad sign.  The joint itself appeared intact.  There was no evidence of any obvious fractures.    Assessment And Plan:    1.  Cellulitis left elbow following dog bite.    2.  In my opinion, she clearly has a significant infection laterally in her soft tissues.  Certainly it is of concern that he may have entered the joint, although it does not appear clinically to be so at this juncture.  I think it is reasonable to start the Unasyn and observe her and see how she responds.  Clearly if she does not improve then performing surgery to wash out the joint would be indicated.  Tonight she can eat.  Will keep her n.p.o. after midnight.  We will re-evaluate her tomorrow.        Roldan Stewart MD    D:  08/01/2017 19:13 T:  08/01/2017 22:18  Document:  [id number]

## 2017-08-02 NOTE — PLAN OF CARE
Problem: Infection, Risk/Actual (Adult)  Goal: Identify Related Risk Factors and Signs and Symptoms  Related risk factors and signs and symptoms are identified upon initiation of Human Response Clinical Practice Guideline (CPG)   Outcome: No Change  VSS, afebrile. Patient rating left arm pain 5/10. Oxycodone x1 with good relief. Left arm reddened and swollen. Small amount of serous drainage from puncture wounds. PIV patent and infusing. Continuing scheduled unasyn. Regular diet but will be NPO at midnight for possible procedure tomorrow. BC pending. Patient refused vitamins for admitted daily alcohol use. Acting appropriately with staff. Will continue to monitor and provide for needs.

## 2017-08-02 NOTE — PROGRESS NOTES
[facility]  Hospitalist Progress Note  Clara Rubalcava MD 08/02/17  Text Page  Pager: [phone] (7am-6pm)    Reason for Stay (Diagnosis): Cellulitis         Assessment and Plan:      Summary of Stay: Sarah Ulrich is a 61 year old female with past medical history of HTN, tobacco use disorder and depression who was admitted on 8/1/2017 after a dog bite and was found to have cellulitis.  Has been started on Unasyn, orthopedics following but no surgical plans at this time.    Problem List/Assessment and Plan:     1. Left Arm Cellulitis due to Dog Bite: Patient reports swelling and redness have not changed since last night, site marked this morning.  Will continue Unasyn for now.  Orthopedics following, at this time does not appear to have a septic joint and no plans for surgery but they will continue to follow.  - Orthopedics following, appreciate recommendations  - Continue IV Unasyn  - Pain control with PO Tylenol, PO Oxycodone or IV Dilaudid for severe pain    2. HTN: continue PTA Lisinopril.    3. Tobacco Use Disorder: NRT ordered.    4. Depression: Continue PTA Zoloft.    5. Hyponatremia - Resolved: Resolved with IVF, stop fluids as she is currently on regular diet and tolerating this well.    6. Alcohol Use: Last drink was on Monday.  No evidence of withdrawal here.  Can continue with vitamins but do not suspect she will need them at discharge.    DVT Prophylaxis: Pneumatic Compression Devices  Code Status: Full Code  Discharge Dispo: Home  Estimated Disch Date / # of Days until Disch: Suspect 1-2 days pending possible surgical intervention and transition to PO antibiotics        Interval History (Subjective):      Patient was seen with her bedside nurse this morning.  She is still having pain in the left arm but medications are helping.  Does not feel that the swelling or redness have improved at all yet.  Denies fevers, chills, CP, SOB, nausea, emesis.  Eating and drinking fine.                   "Physical Exam:      Last Vital Signs:  /69  Pulse 80  Temp 98.2  F (36.8  C) (Oral)  Resp 16  Ht 1.575 m (5' 2\")  Wt 49.1 kg (108 lb 4.8 oz)  SpO2 98%  BMI 19.81 kg/m2    General: Alert, awake, no acute distress.  HEENT: Normocephalic and atraumatic, eyes anicteric and without scleral injection, EOMI, face symmetric, MMM.  Cardiac: RRR, normal S1, S2. No m/g/r, no LE edema.  Pulmonary: Normal chest rise, normal work of breathing.  Lungs CTAB without crackles or wheezing.  Abdomen: soft, non-tender, non-distended.  Normoactive bowel sounds, no guarding or rebound tenderness.  Extremities: See below for description of wound on left arm.  Warm, well perfused.  Skin: no rashes or lesions.  Warm and Dry.  Left arm with 4 puncture wounds surrounding the elbow some of which had scant drainage.  Erythema from above elbow to wrist (site marked this morning but nurse) and is significantly warm to the touch.  Neuro: No focal deficits.  Speech clear.  Coordination and strength grossly normal.  Psych: Alert and oriented x3. Appropriate affect.         Medications:      All current medications were reviewed with changes reflected in problem list.         Data:      All new lab and imaging data was reviewed.   Labs:    Recent Labs  Lab 08/02/17  0605 08/01/17  1547    125*   POTASSIUM 3.9 4.3   CHLORIDE 104 91*   CO2 25 25   ANIONGAP 7 9   GLC 94 92   BUN 6* 7   CR 0.40* 0.60   GFRESTIMATED >90Non  GFR Calc >90Non  GFR Calc   GFRESTBLACK >90African American GFR Calc >90African American GFR Calc   SCOTT 8.1* 9.2       Recent Labs  Lab 08/01/17  1547   WBC 10.5   HGB 14.2   HCT 42.0   MCV 90         Imaging:   Recent Results (from the past 24 hour(s))   Elbow  XR, G/E 3 views, left    Narrative    ELBOW LEFT THREE OR MORE VIEWS   8/1/2017 5:01 PM     HISTORY: Dog bite, swelling.    COMPARISON: None.      Impression    IMPRESSION: There is an elbow joint effusion. There is " associated soft  tissue swelling posteriorly. No fractures or radiopaque foreign bodies  are identified on these views. Given the clinical history the joint  effusion could possibly be related to an infection. Another  possibility is that the patient also had some trauma and there could  be an occult fracture.    MD Clara CLIFFORD MD.

## 2017-08-02 NOTE — PROGRESS NOTES
[facility]  Daily Orthopedic Rounding Note         Assessment and Plan:    Assessment:   Reason for visit: left elbow cellulitis after dog bite  She reports feeling intermittent chills  She notes pain with trying to reach her face  Pain: 4/10      Plan:   This looks like a superficial infection. Posterior-most bite is draining mostly serous but slightly purulent drainage  Make NPO after midnight in case need for surgical I&D. Dr. Stewart will check in the morning.  -Pain control measures  -Observation  Physical Therapy      Plan:  Mobilize               Physical Exam:   135/70, 98.5, 80, 16  Erythema within borders. Warmth to touch. Three main bites and posterior-most bite is draining serous and mildly purulent fluid. AROM is 10-95. Painful flexion. Afebrile           Data:   All pertinent laboratory data related to this patient encounter reviewed        Mary Corey PA-C  [phone]

## 2017-08-03 ENCOUNTER — ANESTHESIA (OUTPATIENT)
Dept: SURGERY | Facility: CLINIC | Age: 62
DRG: 580 | End: 2017-08-03
Payer: COMMERCIAL

## 2017-08-03 ENCOUNTER — ANESTHESIA EVENT (OUTPATIENT)
Dept: SURGERY | Facility: CLINIC | Age: 62
DRG: 580 | End: 2017-08-03
Payer: COMMERCIAL

## 2017-08-03 LAB
ANION GAP SERPL CALCULATED.3IONS-SCNC: 11 MMOL/L (ref 3–14)
BACTERIA SPEC CULT: NORMAL
BUN SERPL-MCNC: 2 MG/DL (ref 7–30)
CALCIUM SERPL-MCNC: 8.3 MG/DL (ref 8.5–10.1)
CHLORIDE SERPL-SCNC: 100 MMOL/L (ref 94–109)
CO2 SERPL-SCNC: 25 MMOL/L (ref 20–32)
CREAT SERPL-MCNC: 0.39 MG/DL (ref 0.52–1.04)
ERYTHROCYTE [DISTWIDTH] IN BLOOD BY AUTOMATED COUNT: 14 % (ref 10–15)
GFR SERPL CREATININE-BSD FRML MDRD: ABNORMAL ML/MIN/1.7M2
GLUCOSE SERPL-MCNC: 71 MG/DL (ref 70–99)
HCT VFR BLD AUTO: 36.5 % (ref 35–47)
HGB BLD-MCNC: 12.2 G/DL (ref 11.7–15.7)
MCH RBC QN AUTO: 30.7 PG (ref 26.5–33)
MCHC RBC AUTO-ENTMCNC: 33.4 G/DL (ref 31.5–36.5)
MCV RBC AUTO: 92 FL (ref 78–100)
MICRO REPORT STATUS: NORMAL
PLATELET # BLD AUTO: 246 10E9/L (ref 150–450)
POTASSIUM SERPL-SCNC: 3.4 MMOL/L (ref 3.4–5.3)
RBC # BLD AUTO: 3.97 10E12/L (ref 3.8–5.2)
SODIUM SERPL-SCNC: 136 MMOL/L (ref 133–144)
SPECIMEN SOURCE: NORMAL
WBC # BLD AUTO: 6.5 10E9/L (ref 4–11)

## 2017-08-03 PROCEDURE — 27210794 ZZH OR GENERAL SUPPLY STERILE: Performed by: ORTHOPAEDIC SURGERY

## 2017-08-03 PROCEDURE — 25000128 H RX IP 250 OP 636: Performed by: INTERNAL MEDICINE

## 2017-08-03 PROCEDURE — 25000128 H RX IP 250 OP 636: Performed by: ANESTHESIOLOGY

## 2017-08-03 PROCEDURE — 0RBM4ZZ EXCISION OF LEFT ELBOW JOINT, PERCUTANEOUS ENDOSCOPIC APPROACH: ICD-10-PCS | Performed by: ORTHOPAEDIC SURGERY

## 2017-08-03 PROCEDURE — 25000128 H RX IP 250 OP 636: Performed by: NURSE ANESTHETIST, CERTIFIED REGISTERED

## 2017-08-03 PROCEDURE — 25000125 ZZHC RX 250: Performed by: NURSE ANESTHETIST, CERTIFIED REGISTERED

## 2017-08-03 PROCEDURE — 25800025 ZZH RX 258: Performed by: ORTHOPAEDIC SURGERY

## 2017-08-03 PROCEDURE — 37000009 ZZH ANESTHESIA TECHNICAL FEE, EACH ADDTL 15 MIN: Performed by: ORTHOPAEDIC SURGERY

## 2017-08-03 PROCEDURE — 80048 BASIC METABOLIC PNL TOTAL CA: CPT | Performed by: INTERNAL MEDICINE

## 2017-08-03 PROCEDURE — 36000058 ZZH SURGERY LEVEL 3 EA 15 ADDTL MIN: Performed by: ORTHOPAEDIC SURGERY

## 2017-08-03 PROCEDURE — 27210995 ZZH RX 272: Performed by: ORTHOPAEDIC SURGERY

## 2017-08-03 PROCEDURE — 25000566 ZZH SEVOFLURANE, EA 15 MIN: Performed by: ORTHOPAEDIC SURGERY

## 2017-08-03 PROCEDURE — 87076 CULTURE ANAEROBE IDENT EACH: CPT | Performed by: ORTHOPAEDIC SURGERY

## 2017-08-03 PROCEDURE — 87185 SC STD ENZYME DETCJ PER NZM: CPT | Performed by: ORTHOPAEDIC SURGERY

## 2017-08-03 PROCEDURE — 25000125 ZZHC RX 250: Performed by: ANESTHESIOLOGY

## 2017-08-03 PROCEDURE — 40000306 ZZH STATISTIC PRE PROC ASSESS II: Performed by: ORTHOPAEDIC SURGERY

## 2017-08-03 PROCEDURE — 12000011 ZZH R&B MS OVERFLOW

## 2017-08-03 PROCEDURE — 37000008 ZZH ANESTHESIA TECHNICAL FEE, 1ST 30 MIN: Performed by: ORTHOPAEDIC SURGERY

## 2017-08-03 PROCEDURE — 25000125 ZZHC RX 250: Performed by: ORTHOPAEDIC SURGERY

## 2017-08-03 PROCEDURE — 87075 CULTR BACTERIA EXCEPT BLOOD: CPT | Performed by: ORTHOPAEDIC SURGERY

## 2017-08-03 PROCEDURE — 25000128 H RX IP 250 OP 636: Performed by: ORTHOPAEDIC SURGERY

## 2017-08-03 PROCEDURE — 99232 SBSQ HOSP IP/OBS MODERATE 35: CPT | Performed by: INTERNAL MEDICINE

## 2017-08-03 PROCEDURE — 85027 COMPLETE CBC AUTOMATED: CPT | Performed by: INTERNAL MEDICINE

## 2017-08-03 PROCEDURE — 25000132 ZZH RX MED GY IP 250 OP 250 PS 637: Performed by: ORTHOPAEDIC SURGERY

## 2017-08-03 PROCEDURE — 36000056 ZZH SURGERY LEVEL 3 1ST 30 MIN: Performed by: ORTHOPAEDIC SURGERY

## 2017-08-03 PROCEDURE — 40000275 ZZH STATISTIC RCP TIME EA 10 MIN

## 2017-08-03 PROCEDURE — 87070 CULTURE OTHR SPECIMN AEROBIC: CPT | Performed by: ORTHOPAEDIC SURGERY

## 2017-08-03 PROCEDURE — 36415 COLL VENOUS BLD VENIPUNCTURE: CPT | Performed by: INTERNAL MEDICINE

## 2017-08-03 PROCEDURE — 71000013 ZZH RECOVERY PHASE 1 LEVEL 1 EA ADDTL HR: Performed by: ORTHOPAEDIC SURGERY

## 2017-08-03 PROCEDURE — 25000132 ZZH RX MED GY IP 250 OP 250 PS 637: Performed by: INTERNAL MEDICINE

## 2017-08-03 PROCEDURE — 0KB80ZZ EXCISION OF LEFT UPPER ARM MUSCLE, OPEN APPROACH: ICD-10-PCS | Performed by: ORTHOPAEDIC SURGERY

## 2017-08-03 PROCEDURE — 25000125 ZZHC RX 250: Performed by: INTERNAL MEDICINE

## 2017-08-03 PROCEDURE — 93005 ELECTROCARDIOGRAM TRACING: CPT

## 2017-08-03 PROCEDURE — 71000012 ZZH RECOVERY PHASE 1 LEVEL 1 FIRST HR: Performed by: ORTHOPAEDIC SURGERY

## 2017-08-03 RX ORDER — ACETAMINOPHEN 325 MG/1
975 TABLET ORAL EVERY 8 HOURS
Status: DISCONTINUED | OUTPATIENT
Start: 2017-08-03 | End: 2017-08-06 | Stop reason: HOSPADM

## 2017-08-03 RX ORDER — GLYCOPYRROLATE 0.2 MG/ML
INJECTION, SOLUTION INTRAMUSCULAR; INTRAVENOUS PRN
Status: DISCONTINUED | OUTPATIENT
Start: 2017-08-03 | End: 2017-08-03

## 2017-08-03 RX ORDER — DIMENHYDRINATE 50 MG/ML
25 INJECTION, SOLUTION INTRAMUSCULAR; INTRAVENOUS
Status: DISCONTINUED | OUTPATIENT
Start: 2017-08-03 | End: 2017-08-03 | Stop reason: HOSPADM

## 2017-08-03 RX ORDER — ONDANSETRON 4 MG/1
4 TABLET, ORALLY DISINTEGRATING ORAL EVERY 6 HOURS PRN
Status: DISCONTINUED | OUTPATIENT
Start: 2017-08-03 | End: 2017-08-06 | Stop reason: HOSPADM

## 2017-08-03 RX ORDER — KETOROLAC TROMETHAMINE 30 MG/ML
30 INJECTION, SOLUTION INTRAMUSCULAR; INTRAVENOUS ONCE
Status: COMPLETED | OUTPATIENT
Start: 2017-08-03 | End: 2017-08-03

## 2017-08-03 RX ORDER — MULTIVITAMIN,THERAPEUTIC
1 TABLET ORAL DAILY
Status: DISCONTINUED | OUTPATIENT
Start: 2017-08-04 | End: 2017-08-06 | Stop reason: HOSPADM

## 2017-08-03 RX ORDER — ACETAMINOPHEN 325 MG/1
650 TABLET ORAL EVERY 4 HOURS PRN
Status: DISCONTINUED | OUTPATIENT
Start: 2017-08-06 | End: 2017-08-06 | Stop reason: HOSPADM

## 2017-08-03 RX ORDER — NEOSTIGMINE METHYLSULFATE 1 MG/ML
VIAL (ML) INJECTION PRN
Status: DISCONTINUED | OUTPATIENT
Start: 2017-08-03 | End: 2017-08-03

## 2017-08-03 RX ORDER — FOLIC ACID 1 MG/1
1 TABLET ORAL DAILY
Status: DISCONTINUED | OUTPATIENT
Start: 2017-08-04 | End: 2017-08-06 | Stop reason: HOSPADM

## 2017-08-03 RX ORDER — ONDANSETRON 2 MG/ML
4 INJECTION INTRAMUSCULAR; INTRAVENOUS EVERY 6 HOURS PRN
Status: DISCONTINUED | OUTPATIENT
Start: 2017-08-03 | End: 2017-08-06 | Stop reason: HOSPADM

## 2017-08-03 RX ORDER — SODIUM CHLORIDE, SODIUM LACTATE, POTASSIUM CHLORIDE, CALCIUM CHLORIDE 600; 310; 30; 20 MG/100ML; MG/100ML; MG/100ML; MG/100ML
INJECTION, SOLUTION INTRAVENOUS CONTINUOUS
Status: DISCONTINUED | OUTPATIENT
Start: 2017-08-03 | End: 2017-08-06 | Stop reason: HOSPADM

## 2017-08-03 RX ORDER — DEXAMETHASONE SODIUM PHOSPHATE 4 MG/ML
INJECTION, SOLUTION INTRA-ARTICULAR; INTRALESIONAL; INTRAMUSCULAR; INTRAVENOUS; SOFT TISSUE PRN
Status: DISCONTINUED | OUTPATIENT
Start: 2017-08-03 | End: 2017-08-03

## 2017-08-03 RX ORDER — ONDANSETRON 4 MG/1
4 TABLET, ORALLY DISINTEGRATING ORAL EVERY 30 MIN PRN
Status: DISCONTINUED | OUTPATIENT
Start: 2017-08-03 | End: 2017-08-03 | Stop reason: HOSPADM

## 2017-08-03 RX ORDER — HYDROXYZINE HYDROCHLORIDE 25 MG/1
25 TABLET, FILM COATED ORAL EVERY 6 HOURS PRN
Status: DISCONTINUED | OUTPATIENT
Start: 2017-08-03 | End: 2017-08-06 | Stop reason: HOSPADM

## 2017-08-03 RX ORDER — LIDOCAINE HYDROCHLORIDE 10 MG/ML
INJECTION, SOLUTION INFILTRATION; PERINEURAL PRN
Status: DISCONTINUED | OUTPATIENT
Start: 2017-08-03 | End: 2017-08-03

## 2017-08-03 RX ORDER — LISINOPRIL 10 MG/1
10 TABLET ORAL DAILY
Status: DISCONTINUED | OUTPATIENT
Start: 2017-08-04 | End: 2017-08-06 | Stop reason: HOSPADM

## 2017-08-03 RX ORDER — NALOXONE HYDROCHLORIDE 0.4 MG/ML
.1-.4 INJECTION, SOLUTION INTRAMUSCULAR; INTRAVENOUS; SUBCUTANEOUS
Status: DISCONTINUED | OUTPATIENT
Start: 2017-08-03 | End: 2017-08-06 | Stop reason: HOSPADM

## 2017-08-03 RX ORDER — AMOXICILLIN 250 MG
1-2 CAPSULE ORAL 2 TIMES DAILY
Status: DISCONTINUED | OUTPATIENT
Start: 2017-08-03 | End: 2017-08-06 | Stop reason: HOSPADM

## 2017-08-03 RX ORDER — ACETAMINOPHEN 10 MG/ML
1000 INJECTION, SOLUTION INTRAVENOUS ONCE
Status: COMPLETED | OUTPATIENT
Start: 2017-08-03 | End: 2017-08-03

## 2017-08-03 RX ORDER — SODIUM CHLORIDE, SODIUM LACTATE, POTASSIUM CHLORIDE, CALCIUM CHLORIDE 600; 310; 30; 20 MG/100ML; MG/100ML; MG/100ML; MG/100ML
INJECTION, SOLUTION INTRAVENOUS CONTINUOUS
Status: DISCONTINUED | OUTPATIENT
Start: 2017-08-03 | End: 2017-08-03 | Stop reason: HOSPADM

## 2017-08-03 RX ORDER — ZOLPIDEM TARTRATE 5 MG/1
5 TABLET ORAL
Status: DISCONTINUED | OUTPATIENT
Start: 2017-08-04 | End: 2017-08-06 | Stop reason: HOSPADM

## 2017-08-03 RX ORDER — FENTANYL CITRATE 50 UG/ML
INJECTION, SOLUTION INTRAMUSCULAR; INTRAVENOUS PRN
Status: DISCONTINUED | OUTPATIENT
Start: 2017-08-03 | End: 2017-08-03

## 2017-08-03 RX ORDER — BUPIVACAINE HYDROCHLORIDE AND EPINEPHRINE 5; 5 MG/ML; UG/ML
INJECTION, SOLUTION PERINEURAL PRN
Status: DISCONTINUED | OUTPATIENT
Start: 2017-08-03 | End: 2017-08-03

## 2017-08-03 RX ORDER — ONDANSETRON 2 MG/ML
4 INJECTION INTRAMUSCULAR; INTRAVENOUS EVERY 30 MIN PRN
Status: DISCONTINUED | OUTPATIENT
Start: 2017-08-03 | End: 2017-08-03 | Stop reason: HOSPADM

## 2017-08-03 RX ORDER — KETOROLAC TROMETHAMINE 15 MG/ML
15 INJECTION, SOLUTION INTRAMUSCULAR; INTRAVENOUS EVERY 6 HOURS
Status: COMPLETED | OUTPATIENT
Start: 2017-08-03 | End: 2017-08-04

## 2017-08-03 RX ORDER — LABETALOL HYDROCHLORIDE 5 MG/ML
10 INJECTION, SOLUTION INTRAVENOUS
Status: DISCONTINUED | OUTPATIENT
Start: 2017-08-03 | End: 2017-08-03 | Stop reason: HOSPADM

## 2017-08-03 RX ORDER — FENTANYL CITRATE 50 UG/ML
25-50 INJECTION, SOLUTION INTRAMUSCULAR; INTRAVENOUS
Status: DISCONTINUED | OUTPATIENT
Start: 2017-08-03 | End: 2017-08-03 | Stop reason: HOSPADM

## 2017-08-03 RX ORDER — PROPOFOL 10 MG/ML
INJECTION, EMULSION INTRAVENOUS PRN
Status: DISCONTINUED | OUTPATIENT
Start: 2017-08-03 | End: 2017-08-03

## 2017-08-03 RX ORDER — DIAZEPAM 5 MG
5 TABLET ORAL EVERY 6 HOURS PRN
Status: DISCONTINUED | OUTPATIENT
Start: 2017-08-03 | End: 2017-08-06 | Stop reason: HOSPADM

## 2017-08-03 RX ORDER — NICOTINE 21 MG/24HR
1 PATCH, TRANSDERMAL 24 HOURS TRANSDERMAL DAILY
Status: DISCONTINUED | OUTPATIENT
Start: 2017-08-04 | End: 2017-08-06 | Stop reason: HOSPADM

## 2017-08-03 RX ORDER — OXYCODONE HYDROCHLORIDE 5 MG/1
5-10 TABLET ORAL
Status: DISCONTINUED | OUTPATIENT
Start: 2017-08-03 | End: 2017-08-06 | Stop reason: HOSPADM

## 2017-08-03 RX ORDER — LIDOCAINE 40 MG/G
CREAM TOPICAL
Status: DISCONTINUED | OUTPATIENT
Start: 2017-08-03 | End: 2017-08-03 | Stop reason: HOSPADM

## 2017-08-03 RX ORDER — HYDROMORPHONE HYDROCHLORIDE 1 MG/ML
.3-.5 INJECTION, SOLUTION INTRAMUSCULAR; INTRAVENOUS; SUBCUTANEOUS
Status: DISCONTINUED | OUTPATIENT
Start: 2017-08-03 | End: 2017-08-06 | Stop reason: HOSPADM

## 2017-08-03 RX ORDER — LANOLIN ALCOHOL/MO/W.PET/CERES
100 CREAM (GRAM) TOPICAL DAILY
Status: DISCONTINUED | OUTPATIENT
Start: 2017-08-04 | End: 2017-08-06 | Stop reason: HOSPADM

## 2017-08-03 RX ORDER — HYDRALAZINE HYDROCHLORIDE 20 MG/ML
2.5-5 INJECTION INTRAMUSCULAR; INTRAVENOUS EVERY 10 MIN PRN
Status: DISCONTINUED | OUTPATIENT
Start: 2017-08-03 | End: 2017-08-03 | Stop reason: HOSPADM

## 2017-08-03 RX ORDER — LIDOCAINE 40 MG/G
CREAM TOPICAL
Status: DISCONTINUED | OUTPATIENT
Start: 2017-08-03 | End: 2017-08-06 | Stop reason: HOSPADM

## 2017-08-03 RX ORDER — GABAPENTIN 300 MG/1
300 CAPSULE ORAL 3 TIMES DAILY
Status: DISCONTINUED | OUTPATIENT
Start: 2017-08-03 | End: 2017-08-06 | Stop reason: HOSPADM

## 2017-08-03 RX ORDER — AMPICILLIN AND SULBACTAM 2; 1 G/1; G/1
3 INJECTION, POWDER, FOR SOLUTION INTRAMUSCULAR; INTRAVENOUS EVERY 6 HOURS
Status: DISCONTINUED | OUTPATIENT
Start: 2017-08-03 | End: 2017-08-06 | Stop reason: HOSPADM

## 2017-08-03 RX ADMIN — AMPICILLIN SODIUM AND SULBACTAM SODIUM 3 G: 2; 1 INJECTION, POWDER, FOR SOLUTION INTRAMUSCULAR; INTRAVENOUS at 11:43

## 2017-08-03 RX ADMIN — BUPIVACAINE HYDROCHLORIDE AND EPINEPHRINE BITARTRATE 20 ML: 5; .005 INJECTION, SOLUTION EPIDURAL; INTRACAUDAL; PERINEURAL at 16:13

## 2017-08-03 RX ADMIN — FENTANYL CITRATE 50 MCG: 50 INJECTION INTRAMUSCULAR; INTRAVENOUS at 15:38

## 2017-08-03 RX ADMIN — FOLIC ACID 1 MG: 1 TABLET ORAL at 08:06

## 2017-08-03 RX ADMIN — SODIUM CHLORIDE, POTASSIUM CHLORIDE, SODIUM LACTATE AND CALCIUM CHLORIDE: 600; 310; 30; 20 INJECTION, SOLUTION INTRAVENOUS at 13:18

## 2017-08-03 RX ADMIN — FENTANYL CITRATE 100 MCG: 50 INJECTION, SOLUTION INTRAMUSCULAR; INTRAVENOUS at 14:09

## 2017-08-03 RX ADMIN — AMPICILLIN SODIUM AND SULBACTAM SODIUM 3 G: 2; 1 INJECTION, POWDER, FOR SOLUTION INTRAMUSCULAR; INTRAVENOUS at 05:29

## 2017-08-03 RX ADMIN — Medication 0.2 MG: at 12:19

## 2017-08-03 RX ADMIN — FENTANYL CITRATE 150 MCG: 50 INJECTION, SOLUTION INTRAMUSCULAR; INTRAVENOUS at 14:35

## 2017-08-03 RX ADMIN — HYDROMORPHONE HYDROCHLORIDE 0.5 MG: 1 INJECTION, SOLUTION INTRAMUSCULAR; INTRAVENOUS; SUBCUTANEOUS at 15:54

## 2017-08-03 RX ADMIN — OXYCODONE HYDROCHLORIDE 10 MG: 5 TABLET ORAL at 05:27

## 2017-08-03 RX ADMIN — THERA TABS 1 TABLET: TAB at 08:06

## 2017-08-03 RX ADMIN — GABAPENTIN 300 MG: 300 CAPSULE ORAL at 08:06

## 2017-08-03 RX ADMIN — KETOROLAC TROMETHAMINE 15 MG: 15 INJECTION, SOLUTION INTRAMUSCULAR; INTRAVENOUS at 21:12

## 2017-08-03 RX ADMIN — Medication 1 MG: at 15:09

## 2017-08-03 RX ADMIN — MIDAZOLAM HYDROCHLORIDE 2 MG: 1 INJECTION, SOLUTION INTRAMUSCULAR; INTRAVENOUS at 14:06

## 2017-08-03 RX ADMIN — FENTANYL CITRATE 50 MCG: 50 INJECTION INTRAMUSCULAR; INTRAVENOUS at 15:30

## 2017-08-03 RX ADMIN — ACETAMINOPHEN 975 MG: 325 TABLET, FILM COATED ORAL at 23:15

## 2017-08-03 RX ADMIN — LIDOCAINE HYDROCHLORIDE 50 MG: 10 INJECTION, SOLUTION INFILTRATION; PERINEURAL at 14:09

## 2017-08-03 RX ADMIN — HYDROMORPHONE HYDROCHLORIDE 0.5 MG: 1 INJECTION, SOLUTION INTRAMUSCULAR; INTRAVENOUS; SUBCUTANEOUS at 15:48

## 2017-08-03 RX ADMIN — Medication 0.2 MG: at 10:14

## 2017-08-03 RX ADMIN — OXYCODONE HYDROCHLORIDE 10 MG: 5 TABLET ORAL at 08:11

## 2017-08-03 RX ADMIN — GLYCOPYRROLATE 0.4 MG: 0.2 INJECTION, SOLUTION INTRAMUSCULAR; INTRAVENOUS at 15:05

## 2017-08-03 RX ADMIN — Medication 2 MG: at 15:05

## 2017-08-03 RX ADMIN — GLYCOPYRROLATE 0.2 MG: 0.2 INJECTION, SOLUTION INTRAMUSCULAR; INTRAVENOUS at 14:09

## 2017-08-03 RX ADMIN — GABAPENTIN 300 MG: 300 CAPSULE ORAL at 20:00

## 2017-08-03 RX ADMIN — PROPOFOL 200 MG: 10 INJECTION, EMULSION INTRAVENOUS at 14:09

## 2017-08-03 RX ADMIN — ROCURONIUM BROMIDE 30 MG: 10 INJECTION INTRAVENOUS at 14:09

## 2017-08-03 RX ADMIN — SENNOSIDES AND DOCUSATE SODIUM 1 TABLET: 8.6; 5 TABLET ORAL at 20:00

## 2017-08-03 RX ADMIN — LISINOPRIL 10 MG: 10 TABLET ORAL at 08:07

## 2017-08-03 RX ADMIN — SERTRALINE HYDROCHLORIDE 150 MG: 100 TABLET ORAL at 08:06

## 2017-08-03 RX ADMIN — KETOROLAC TROMETHAMINE 30 MG: 30 INJECTION, SOLUTION INTRAMUSCULAR at 15:50

## 2017-08-03 RX ADMIN — Medication 100 MG: at 08:06

## 2017-08-03 RX ADMIN — FENTANYL CITRATE 50 MCG: 50 INJECTION INTRAMUSCULAR; INTRAVENOUS at 15:58

## 2017-08-03 RX ADMIN — AMPICILLIN SODIUM AND SULBACTAM SODIUM 3 G: 2; 1 INJECTION, POWDER, FOR SOLUTION INTRAMUSCULAR; INTRAVENOUS at 19:48

## 2017-08-03 RX ADMIN — ONDANSETRON 4 MG: 2 INJECTION INTRAMUSCULAR; INTRAVENOUS at 14:09

## 2017-08-03 RX ADMIN — ACETAMINOPHEN 1000 MG: 10 INJECTION, SOLUTION INTRAVENOUS at 15:48

## 2017-08-03 RX ADMIN — SODIUM CHLORIDE, POTASSIUM CHLORIDE, SODIUM LACTATE AND CALCIUM CHLORIDE: 600; 310; 30; 20 INJECTION, SOLUTION INTRAVENOUS at 16:22

## 2017-08-03 RX ADMIN — NICOTINE 1 PATCH: 21 PATCH, EXTENDED RELEASE TRANSDERMAL at 08:07

## 2017-08-03 RX ADMIN — DEXAMETHASONE SODIUM PHOSPHATE 4 MG: 4 INJECTION, SOLUTION INTRA-ARTICULAR; INTRALESIONAL; INTRAMUSCULAR; INTRAVENOUS; SOFT TISSUE at 14:09

## 2017-08-03 RX ADMIN — SODIUM CHLORIDE, POTASSIUM CHLORIDE, SODIUM LACTATE AND CALCIUM CHLORIDE: 600; 310; 30; 20 INJECTION, SOLUTION INTRAVENOUS at 14:35

## 2017-08-03 ASSESSMENT — ENCOUNTER SYMPTOMS
STRIDOR: 0
DYSRHYTHMIAS: 0
SEIZURES: 0

## 2017-08-03 ASSESSMENT — PAIN DESCRIPTION - DESCRIPTORS
DESCRIPTORS: THROBBING

## 2017-08-03 ASSESSMENT — COPD QUESTIONNAIRES: COPD: 0

## 2017-08-03 ASSESSMENT — LIFESTYLE VARIABLES: TOBACCO_USE: 1

## 2017-08-03 NOTE — ANESTHESIA CARE TRANSFER NOTE
Patient: Sarah Ulrich    Procedure(s):  Arthroscopic irrigation and debridement left elbow - Wound Class: IV-Dirty or Infected    Diagnosis: infection  Diagnosis Additional Information: No value filed.    Anesthesia Type:   General, ETT     Note:  Airway :Face Mask  Patient transferred to:PACU        Vitals: (Last set prior to Anesthesia Care Transfer)    CRNA VITALS  8/3/2017 1450 - 8/3/2017 1527      8/3/2017             Pulse: 120    SpO2: 100 %    Resp Rate (observed): 19                Electronically Signed By: ALBERT Marin CRNA  August 3, 2017  3:27 PM

## 2017-08-03 NOTE — PROGRESS NOTES
[facility]  Daily Orthopedic Rounding Note         Assessment and Plan:    Assessment:   Reason for visit: infection left elbow   no any better.  Still draining  Pain: 4/10      Plan:   To OR this afternoon for I and D  -keep npo      Plan:  Pain control               Physical Exam:   143/66, 98.2, 80, 16  Purulent drainage from left elbow.  Redness unchanged  5-105  Peripheral pulses intact, neurovascular intact, neuromuscular intact, calf soft and non-tender bilaterally,            Data:   All laboratory data related to this surgery reviewed  All pertinent laboratory data related to this patient encounter reviewed        Roldan Stewart MD  [phone]

## 2017-08-03 NOTE — BRIEF OP NOTE
[facility] Brief Operative Note    Pre-operative diagnosis: Infection after dog bite left elbow   Post-operative diagnosis Same  Mild elbow osteoarthritis   Procedure: Procedure(s):  Arthroscopic irrigation and debridement left elbow - Wound Class: IV-Dirty or Infected   Surgeon(s): Surgeon(s) and Role:     * Roldan Stewart MD - Primary     * Mary Corey PA-C - Assisting   Estimated blood loss: 10 mL    Specimens:   ID Type Source Tests Collected by Time Destination   1 : left elbow fluid Wound Elbow, Left ANAEROBIC BACTERIAL CULTURE, WOUND CULTURE AEROBIC BACTERIAL Roldan Stewart MD 8/3/2017  2:33 PM    2 : left elbow fluid Wound Elbow, Left ANAEROBIC BACTERIAL CULTURE, WOUND CULTURE AEROBIC BACTERIAL Roldan Stewart MD 8/3/2017  2:41 PM       Findings: See op note

## 2017-08-03 NOTE — PLAN OF CARE
Problem: Goal Outcome Summary  Goal: Goal Outcome Summary  Outcome: No Change     VSS. Up in room independently. NPO for surgery. Pain managed with oxy x1 and IV dilaudid x2. L arm wound red, swollen, painful - covered with ace wrap per surgeon. On IV unasyn. CIWA 0 x2. Left for I&D procedure at 1300.

## 2017-08-03 NOTE — ANESTHESIA PREPROCEDURE EVALUATION
Anesthesia Evaluation     . Pt has had prior anesthetic. Type: General    No history of anesthetic complications          ROS/MED HX    ENT/Pulmonary:  - neg pulmonary ROS   (+)tobacco use, Current use , . .   (-) asthma, COPD and recent URI   Neurologic:  - neg neurologic ROS    (-) seizures and CVA   Cardiovascular:     (+) hypertension----. : . . . :. .      (-) CAD, arrhythmias and valvular problems/murmurs   METS/Exercise Tolerance:     Hematologic: Comments: Lab Test        08/03/17 08/01/17                       0610          1547          WBC          6.5          10.5          HGB          12.2         14.2          MCV          92           90            PLT          246          244            Lab Test        08/03/17 08/02/17 08/01/17                       0610          0605          1547          NA           136          136          125*          POTASSIUM    3.4          3.9          4.3           CHLORIDE     100          104          91*           CO2          25           25           25            BUN          2*           6*           7             CR           0.39*        0.40*        0.60          ANIONGAP     11           7            9             SCOTT          8.3*         8.1*         9.2           GLC          71           94           92           - neg hematologic  ROS       Musculoskeletal:  - neg musculoskeletal ROS       GI/Hepatic:     (+) Other GI/Hepatic EtOH abuse     (-) GERD   Renal/Genitourinary:  - ROS Renal section negative       Endo:  - neg endo ROS    (-) Type I DM, Type II DM, thyroid disease, chronic steroid usage and obesity   Psychiatric:     (+) psychiatric history depression      Infectious Disease:  - neg infectious disease ROS       Malignancy:   (+) Malignancy History of Skin          Other:    - neg other ROS                 Physical Exam  Normal systems: cardiovascular, pulmonary and dental    Airway   Mallampati: II  TM distance: >3 FB  Neck ROM:  full    Dental   (+) upper dentures and partials    Cardiovascular   Rhythm and rate: regular and normal  (-) no friction rub, no systolic click and no murmur    Pulmonary    breath sounds clear to auscultation(-) no rhonchi, no decreased breath sounds, no wheezes, no rales and no stridor                    Anesthesia Plan      History & Physical Review  History and physical reviewed and following examination; no interval change.    ASA Status:  2 .    NPO Status:  > 8 hours    Plan for General, ETT and Periph. Nerve Block for postop pain with Intravenous induction. Maintenance will be Balanced.    PONV prophylaxis:  Ondansetron (or other 5HT-3) and Dexamethasone or Solumedrol  Addendum:  Patient consented to pain block in PACU after Dr. Stewart suggested it.      Postoperative Care  Postoperative pain management:  IV analgesics.      Consents  Anesthetic plan, risks, benefits and alternatives discussed with:  Patient or representative and Patient..                          .

## 2017-08-03 NOTE — PLAN OF CARE
"Problem: Goal Outcome Summary  Goal: Goal Outcome Summary  VS /66 (BP Location: Right arm)  Pulse 80  Temp 98.2  F (36.8  C) (Oral)  Resp 16  Ht 1.575 m (5' 2\")  Wt 49.1 kg (108 lb 4.8 oz)  SpO2 97%  BMI 19.81 kg/m2   Patient NPO   IVF Saline locked, IV abx  Dressings  L arm dressing will small amount of drainage.  Skin L arm/elbow red and warm to touch, redness outlined.  Activity up independently  Pain PRN oxycodone x2 for L arm pain.  Patient/family centered care Plan of care discussed with patient. Continuing to provide supportive cares.  Plan Possible I&D today, D/C TBD      "

## 2017-08-03 NOTE — OP NOTE
DATE OF SURGERY:  08/03/2017.      LOCATION:  [facility].      PREOPERATIVE DIAGNOSES:   1.  Soft tissue infection, left lateral elbow, with surrounding cellulitis following dog bite.   2.  Possible septic arthritis, left elbow, status post the above.      POSTOPERATIVE DIAGNOSES:   1.  Soft tissue infection, left lateral elbow, with surrounding cellulitis following dog bite.   2.  Mild degenerative arthritis, left elbow, with synovitis.   3.  Possible septic arthritis following dog bite, left elbow.      PROCEDURES PERFORMED:   1.  Local debridement of skin, fascia and muscle in 2 wounds, left lateral elbow, measuring 2 cm each.   2.  Arthroscopic extensive debridement with complete anterior and posterior synovectomies, left elbow.      OPERATING SURGEON:  Roldan Stewart MD, LUIS       FIRST ASSISTANT:  Mary Corey PA-C      ANESTHESIA:  General with scalene block.      ESTIMATED BLOOD LOSS:  Less than 10 mL.      TOURNIQUET TIME:  55 minutes.      PREOPERATIVE ANTIBIOTICS:  None given.      CULTURES OBTAINED:  One from the soft tissue wounds and one with a fluid aspiration from the joint.      INDICATIONS:  Sarah Ulrich is a 61-year-old woman who was bit by a [place] mountain dog approximately 4 days ago.  She was admitted to the hospital on Tuesday late in the afternoon.  I evaluated her in the emergency room.  She definitely had cellulitis in the lateral aspect of her elbow.  She had 2 approximately 2 cm long puncture-type wounds, one above the joint on the lateral side at the level of her triceps, one distal to the joint over the anconeus.  She had 2 smaller puncture wounds.  They were draining some serosanguineous fluid.  Her elbow motion was pretty good at 110 or 120 degrees of flexion.  She did not appear grossly infected.  Despite 36 hours of IV Unasyn, her symptoms did not significantly improve.  She remained afebrile and her white count came down, but the erythema over the lateral  aspect of the elbow remained essentially unchanged and her pain was about the same.  In light of this, I elected to take her to the operating room.  I still was not convinced that her elbow joint itself was infected, but in light of her failure to improve, I felt she deserved debridement of the bite wounds.  The surgery, potential risks, benefits, complications, as well as expected postoperative course and recovery, were discussed in detail and informed consent was obtained.      OPERATIVE REPORT:  The patient was taken to the preoperative area where a scalene block was placed in the left upper extremity by the anesthesiologist.  She was then taken to the operating room where general anesthetic was obtained.  She was placed in the right lateral decubitus position with the left arm over a bolster.  The arm was prepped and draped in a sterile fashion from the wrist to the axilla.  A tourniquet was used.  The arm was elevated and exsanguinated, tourniquet inflated 250 mmHg.  A timeout was performed confirming the left elbow was the operative elbow.  From the 2 lateral puncture wounds, she had some purulent-appearing material.  These were swabbed and sent for culture, both aerobes and anaerobes.  I then made a small superior anteromedial portal on the medial side that was on the opposite side of where her bites were.  I entered the joint.  She had a small amount of synovial fluid.  This was aspirated through the scope sheath.  It looked very clear and normal.  Again, it was very small.  I probably got 5 mL out with the arthroscope in the joint.  I began infiltrating fluid into the joint and none of it drained out of the wounds laterally, indicating to me that there was no obvious communication to the more proximal lateral incision, which appeared to be the one that looked the worst.  The wound edges were debrided sharply.  I inspected it and it looked as though the bite wound went into the triceps and down to the level  of the lateral humerus, but did not appear to enter the joint.  This was carefully probed and there was no fluid leaking out.  Similarly, the more distal posterior lateral bite wound was debrided.  There was some soft necrotic material, fascia and muscle, which was debrided sharply.  It was probed.  It kind of tracked underneath the skin for a centimeter or so, but it did not appear to enter the joint.  Again, there was no active fluid flowing at this point.  Both of these wounds were irrigated with peroxide and copious triple antibiotic saline.  There were 2 small lacerations which were not actively draining at this point and did not appear to communicate to the joint.  The arthroscope was in the joint and she had diffuse synovitis.  An anterolateral portal was created, and through this, a shaver was placed and a synovectomy was performed.  The capsule itself was intact.  The radial head showed mild degenerative changes with some area of grade 3 and 4 changes which appeared to be old and preexisting to entering the joint.  I switched portals, placed the arthroscope in the anterolateral portal, and viewing medially, she had similar amounts of synovitis.  The shaver was brought in medially and a synovectomy was performed.  I released the capsule off the distal humerus a little bit to increase my working space.  I shaved the synovium around the coronoid, but it did not appear to have any significant osteophytes.  There were no osteophytes in the coronoid fossa.  Of note, preoperatively, she came out to full extension, could flex to well over 135 degrees.  I then began flushing fluid through the joint, placed a metal cannula anteriorly and then placed the arthroscope in the direct posterior portal.  I did make a posterior lateral portal a little higher than normal to avoid communicating with the more posterior lateral bite wound.  Again, entering the olecranon fossa, she had diffuse synovitis and a shaver was used to  perform a complete synovectomy.  I went into the radial gutter and inspected the posterior aspect of the radial head, performing a synovectomy here as well.  The radial head showed grade 3 and 4 changes.  I then irrigated with a total of 3 liters of normal saline followed by 3 liters of dilute Betadine normal saline followed by 3 liters of triple antibiotic saline.  I then removed the arthroscope, placed a Hemovac drain in the posterior compartment, closed my portals with interrupted 3-0 Prolene.  The bite wounds we left open and 2 of them we packed with half inch iodoform gauze, the proximal and the distal more significant ones.  She was then placed in a soft, bulky type dressing and a sling.  She was then awoken and transferred to recovery room in stable condition.      POSTOPERATIVE PLAN:  To allow her to use her arm as tolerated.  I would recommend that the drain be removed in 24 hours.  I do anticipate a lot of drainage.  I would ask the wound care team or the nursing staff to instruct the patient to obtain dressing changes with daily iodoform.  She will likely need this with home care as I do not think this is something that she can actively do herself, but we could see if that would be possible.  She will continue her IV Unasyn under the direction of the Medicine team and be discharged when they feel it is appropriate.  We would be happy to see her in approximately 7-10 days for suture removal.         TANK NINO MD             D: 2017 15:36   T: 2017 16:15   MT: TS      Name:     JUAN AMANDA   MRN:      [id number]        Account:        [id number]   :      1955           Procedure Date: 2017      Document: [id number]       cc: Bam Silva DO

## 2017-08-03 NOTE — PLAN OF CARE
Problem: Goal Outcome Summary  Goal: Goal Outcome Summary  Outcome: No Change  VSS, afebrile. Patient rating left elbow pain 5-7/10. Oxycodone given for pain management. Left elbow remains swollen and reddened within markings. New dressing applied. Small amount of serosanguinous drainage. Tolerating regular diet. Patient showered and lost IV access. Will be replaced prior to next dose of Unasyn. Acting appropriately with staff, no signs of alcohol withdrawal. Will continue to monitor.

## 2017-08-03 NOTE — ANESTHESIA POSTPROCEDURE EVALUATION
Patient: Sarah Ulrich    Procedure(s):  Arthroscopic irrigation and debridement left elbow - Wound Class: IV-Dirty or Infected    Diagnosis:infection  Diagnosis Additional Information: 1.  Soft tissue infection, left lateral elbow, with surrounding cellulitis following dog bite.   2.  Mild degenerative arthritis, left elbow, with synovitis.   3.  Possible septic arthritis following dog bite, left elbow    Anesthesia Type:  General, ETT    Note:  Anesthesia Post Evaluation    Patient location during evaluation: PACU  Patient participation: Able to participate in evaluation but full recovery from regional anesthesia has not yet ocurrred but is anticipated to occur within 48 hours  Level of consciousness: sleepy but conscious  Pain management: adequate  Airway patency: patent  Cardiovascular status: acceptable  Respiratory status: acceptable  Hydration status: acceptable  PONV: controlled     Anesthetic complications: None          Last vitals:  Vitals:    08/03/17 1630 08/03/17 1645 08/03/17 1700   BP: 119/62 102/56 94/55   Pulse:      Resp: 10 12 11   Temp:   97.9  F (36.6  C)   SpO2: 98% 93% 91%         Electronically Signed By: Willard Hernandez MD  August 3, 2017  5:28 PM

## 2017-08-03 NOTE — OR NURSING
"Pt c/o pain rates 10/10 with no relief from medications given.  Dr Stewart at the bedside spoke with pt and discussed being okay with a \"block\" for pain.  Pt given additional meds with no relief and spoke with DR Morales about poss block.  DR Morales at bedside spoke with patient about block, risks benefits and complications.  Pt agrees \" I want the pain to stop\".  Pt block placed with DR Morales states hand numb within minutes.  Pt sleeping off and on. Continue to monitor progress and review with report to inpt unit.  "

## 2017-08-03 NOTE — ANESTHESIA PROCEDURE NOTES
Peripheral nerve/Neuraxial procedure note : Brachial plexus (supraclavicular)  Pre-Procedure  Performed by GALINA DUGAN  Referred by TRUNG  Location: post-op    Procedure Times:8/3/2017 4:04 PM and 8/3/2017 4:13 PM  Pre-Anesthestic Checklist: patient identified, IV checked, site marked, risks and benefits discussed, informed consent, monitors and equipment checked, pre-op evaluation, at physician/surgeon's request and post-op pain management    Timeout  Correct Patient: Yes   Correct Procedure: Yes   Correct Site: Yes   Correct Laterality: Yes   Correct Position: Yes   Site Marked: Yes   .   Procedure Documentation    .    Procedure:    Brachial plexus (supraclavicular).     Ultrasound used to identify targeted nerve, plexus, or vascular marker and placed a needle adjacent to it., Ultrasound was used to visualize the spread of the anesthetic in close proximity to the above stated nerve.   Patient Prep;mask, sterile gloves, povidone-iodine 7.5% surgical scrub.  Nerve Stim: Initial Level 0.5 mA. Lowest motor response mA..  Needle: insulated Needle Gauge: 22.    Needle Length (Inches) 2  Insertion Method: Single Shot (incrementally).       Assessment/Narrative  Paresthesias: No.  .  The placement was negative for: blood aspirated, painful injection and site bleeding.  Bolus given via..   Secured via.   Complications: none. Comments:  The surgeon has given a verbal order transferring care of this patient to me for the performance of a regional analgesia block for post-op pain control. It is requested of me because I am uniquely trained and qualified to perform this block and the surgeon is neither trained nor qualified to perform this procedure.

## 2017-08-03 NOTE — PROGRESS NOTES
"[facility]  Hospitalist Progress Note  Patient Name: Sarah Ulrich    MRN: [id number]  Provider: Crow Marquez MD  08/03/17    Initial presenting complaint/issue to hospital (Diagnosis): left arm dog bite with cellulitis         Assessment and Plan:      Summary of Stay: Sarah Ulrich is a 61 year old female with history of HTN, tobacco use disorder and depression who was admitted on 8/1/2017 after a dog bite on her left elbow with cellulitis.  She was treated with Unasyn but did not improve.  Orthopedic surgery was consulted and took patient to the OR on 8/3/17 for incision and drainage.      Problem List/Assessment and Plan:      1. Left Arm Cellulitis due to Dog Bite: Not improving much.  Patient to have I&D in the OR today.      2. HTN: continue PTA Lisinopril.     3. Tobacco Use Disorder: NRT ordered.     4. Depression: Continue PTA Zoloft.     5. Hyponatremia: Resolved with IVF.     6. Alcohol Use: Last drink was on Monday.  No evidence of withdrawal here.       DVT Prophylaxis: Pneumatic Compression Devices  Code Status: Full Code  Discharge Dispo: Home in 1-2 days is likely           Interval History:      Patient was still having some tenderness of left elbow.                   Physical Exam:      Last Vital Signs:  /62  Pulse 80  Temp 97.9  F (36.6  C) (Temporal)  Resp 10  Ht 1.575 m (5' 2\")  Wt 49.1 kg (108 lb 4.8 oz)  SpO2 98%  BMI 19.81 kg/m2    Intake/Output Summary (Last 24 hours) at 08/03/17 1701  Last data filed at 08/03/17 1521   Gross per 24 hour   Intake             2580 ml   Output               10 ml   Net             2570 ml       GENERAL:  Comfortable. Cooperative.  PSYCH: pleasant, oriented, No acute distress.  EYES: PERRLA, Normal conjunctiva.  HEART:  Regular rate and rhythm. No JVD. Pulses normal. No edema.  LUNGS:  Clear to auscultation, normal Respiratory effort.  ABDOMEN:  Soft, no hepatosplenomegaly, normal bowel sounds.  EXTREMETIES: No clubbing, cyanosis or " ischemia.  Left elbow and forearm with erythema and swelling.   SKIN:  Dry to touch, No rash.           Medications:      All current medications were reviewed.         Data:      All new lab and imaging data was reviewed.   Labs:    Recent Labs  Lab 08/03/17  1441 08/03/17  1433 08/01/17  1630 08/01/17  1547   CULT Canceled, Test credited Test reordered as correct code REORDERED AS A FLUID CULTURE Pending  Pending No growth after 2 days No growth after 2 days          Lab Results   Component Value Date     08/03/2017     08/02/2017     08/01/2017    Lab Results   Component Value Date    CHLORIDE 100 08/03/2017    CHLORIDE 104 08/02/2017    CHLORIDE 91 08/01/2017    Lab Results   Component Value Date    BUN 2 08/03/2017    BUN 6 08/02/2017    BUN 7 08/01/2017      Lab Results   Component Value Date    POTASSIUM 3.4 08/03/2017    POTASSIUM 3.9 08/02/2017    POTASSIUM 4.3 08/01/2017    Lab Results   Component Value Date    CO2 25 08/03/2017    CO2 25 08/02/2017    CO2 25 08/01/2017    Lab Results   Component Value Date    CR 0.39 08/03/2017    CR 0.40 08/02/2017    CR 0.60 08/01/2017          Recent Labs  Lab 08/03/17  0610 08/01/17  1547   WBC 6.5 10.5   HGB 12.2 14.2   HCT 36.5 42.0   MCV 92 90    244

## 2017-08-04 ENCOUNTER — APPOINTMENT (OUTPATIENT)
Dept: OCCUPATIONAL THERAPY | Facility: CLINIC | Age: 62
DRG: 580 | End: 2017-08-04
Attending: ORTHOPAEDIC SURGERY
Payer: COMMERCIAL

## 2017-08-04 LAB — INTERPRETATION ECG - MUSE: NORMAL

## 2017-08-04 PROCEDURE — 97110 THERAPEUTIC EXERCISES: CPT | Mod: GO | Performed by: REHABILITATION PRACTITIONER

## 2017-08-04 PROCEDURE — 99232 SBSQ HOSP IP/OBS MODERATE 35: CPT | Performed by: INTERNAL MEDICINE

## 2017-08-04 PROCEDURE — 25000132 ZZH RX MED GY IP 250 OP 250 PS 637: Performed by: ORTHOPAEDIC SURGERY

## 2017-08-04 PROCEDURE — 97165 OT EVAL LOW COMPLEX 30 MIN: CPT | Mod: GO | Performed by: REHABILITATION PRACTITIONER

## 2017-08-04 PROCEDURE — 12000011 ZZH R&B MS OVERFLOW

## 2017-08-04 PROCEDURE — 97535 SELF CARE MNGMENT TRAINING: CPT | Mod: GO | Performed by: REHABILITATION PRACTITIONER

## 2017-08-04 PROCEDURE — 25000128 H RX IP 250 OP 636: Performed by: ORTHOPAEDIC SURGERY

## 2017-08-04 PROCEDURE — 40000901 ZZH STATISTIC WOC PT EDUCATION, 0-15 MIN

## 2017-08-04 PROCEDURE — 99214 OFFICE O/P EST MOD 30 MIN: CPT

## 2017-08-04 PROCEDURE — 40000133 ZZH STATISTIC OT WARD VISIT: Performed by: REHABILITATION PRACTITIONER

## 2017-08-04 RX ORDER — OXYCODONE HYDROCHLORIDE 5 MG/1
5-10 TABLET ORAL EVERY 4 HOURS PRN
Qty: 40 TABLET | Refills: 0 | Status: SHIPPED | OUTPATIENT
Start: 2017-08-04

## 2017-08-04 RX ORDER — ACETAMINOPHEN 325 MG/1
650 TABLET ORAL EVERY 4 HOURS PRN
Qty: 100 TABLET | Refills: 0 | Status: SHIPPED | OUTPATIENT
Start: 2017-08-06 | End: 2020-04-21

## 2017-08-04 RX ORDER — HYDROXYZINE HYDROCHLORIDE 25 MG/1
25 TABLET, FILM COATED ORAL EVERY 6 HOURS PRN
Qty: 30 TABLET | Refills: 0 | Status: SHIPPED | OUTPATIENT
Start: 2017-08-04 | End: 2020-04-21

## 2017-08-04 RX ADMIN — OXYCODONE HYDROCHLORIDE 10 MG: 5 TABLET ORAL at 13:50

## 2017-08-04 RX ADMIN — OXYCODONE HYDROCHLORIDE 10 MG: 5 TABLET ORAL at 10:59

## 2017-08-04 RX ADMIN — OXYCODONE HYDROCHLORIDE 10 MG: 5 TABLET ORAL at 23:57

## 2017-08-04 RX ADMIN — SENNOSIDES AND DOCUSATE SODIUM 1 TABLET: 8.6; 5 TABLET ORAL at 08:19

## 2017-08-04 RX ADMIN — GABAPENTIN 300 MG: 300 CAPSULE ORAL at 20:25

## 2017-08-04 RX ADMIN — Medication 0.5 MG: at 20:32

## 2017-08-04 RX ADMIN — Medication 0.5 MG: at 09:04

## 2017-08-04 RX ADMIN — KETOROLAC TROMETHAMINE 15 MG: 15 INJECTION, SOLUTION INTRAMUSCULAR; INTRAVENOUS at 03:02

## 2017-08-04 RX ADMIN — AMPICILLIN SODIUM AND SULBACTAM SODIUM 3 G: 2; 1 INJECTION, POWDER, FOR SOLUTION INTRAMUSCULAR; INTRAVENOUS at 01:31

## 2017-08-04 RX ADMIN — Medication 1 MG: at 00:00

## 2017-08-04 RX ADMIN — ACETAMINOPHEN 975 MG: 325 TABLET, FILM COATED ORAL at 14:43

## 2017-08-04 RX ADMIN — Medication 0.5 MG: at 12:15

## 2017-08-04 RX ADMIN — OXYCODONE HYDROCHLORIDE 10 MG: 5 TABLET ORAL at 20:51

## 2017-08-04 RX ADMIN — SODIUM CHLORIDE, POTASSIUM CHLORIDE, SODIUM LACTATE AND CALCIUM CHLORIDE: 600; 310; 30; 20 INJECTION, SOLUTION INTRAVENOUS at 21:33

## 2017-08-04 RX ADMIN — GABAPENTIN 300 MG: 300 CAPSULE ORAL at 13:51

## 2017-08-04 RX ADMIN — NICOTINE 1 PATCH: 21 PATCH, EXTENDED RELEASE TRANSDERMAL at 08:22

## 2017-08-04 RX ADMIN — SODIUM CHLORIDE, POTASSIUM CHLORIDE, SODIUM LACTATE AND CALCIUM CHLORIDE: 600; 310; 30; 20 INJECTION, SOLUTION INTRAVENOUS at 01:29

## 2017-08-04 RX ADMIN — LISINOPRIL 10 MG: 10 TABLET ORAL at 08:19

## 2017-08-04 RX ADMIN — ACETAMINOPHEN 975 MG: 325 TABLET, FILM COATED ORAL at 23:57

## 2017-08-04 RX ADMIN — THERA TABS 1 TABLET: TAB at 08:20

## 2017-08-04 RX ADMIN — ACETAMINOPHEN 975 MG: 325 TABLET, FILM COATED ORAL at 06:41

## 2017-08-04 RX ADMIN — GABAPENTIN 300 MG: 300 CAPSULE ORAL at 08:20

## 2017-08-04 RX ADMIN — OXYCODONE HYDROCHLORIDE 10 MG: 5 TABLET ORAL at 17:43

## 2017-08-04 RX ADMIN — Medication 100 MG: at 08:20

## 2017-08-04 RX ADMIN — AMPICILLIN SODIUM AND SULBACTAM SODIUM 3 G: 2; 1 INJECTION, POWDER, FOR SOLUTION INTRAMUSCULAR; INTRAVENOUS at 13:52

## 2017-08-04 RX ADMIN — KETOROLAC TROMETHAMINE 15 MG: 15 INJECTION, SOLUTION INTRAMUSCULAR; INTRAVENOUS at 14:43

## 2017-08-04 RX ADMIN — OXYCODONE HYDROCHLORIDE 10 MG: 5 TABLET ORAL at 06:41

## 2017-08-04 RX ADMIN — KETOROLAC TROMETHAMINE 15 MG: 15 INJECTION, SOLUTION INTRAMUSCULAR; INTRAVENOUS at 08:52

## 2017-08-04 RX ADMIN — FOLIC ACID 1 MG: 1 TABLET ORAL at 08:20

## 2017-08-04 RX ADMIN — SERTRALINE HYDROCHLORIDE 150 MG: 100 TABLET ORAL at 08:19

## 2017-08-04 RX ADMIN — AMPICILLIN SODIUM AND SULBACTAM SODIUM 3 G: 2; 1 INJECTION, POWDER, FOR SOLUTION INTRAMUSCULAR; INTRAVENOUS at 20:14

## 2017-08-04 RX ADMIN — SENNOSIDES AND DOCUSATE SODIUM 1 TABLET: 8.6; 5 TABLET ORAL at 20:25

## 2017-08-04 RX ADMIN — AMPICILLIN SODIUM AND SULBACTAM SODIUM 3 G: 2; 1 INJECTION, POWDER, FOR SOLUTION INTRAMUSCULAR; INTRAVENOUS at 08:09

## 2017-08-04 RX ADMIN — HYDROXYZINE HYDROCHLORIDE 25 MG: 25 TABLET ORAL at 08:33

## 2017-08-04 ASSESSMENT — ACTIVITIES OF DAILY LIVING (ADL): PREVIOUS_RESPONSIBILITIES: MEAL PREP;HOUSEKEEPING;LAUNDRY;SHOPPING;MEDICATION MANAGEMENT;FINANCES;DRIVING;WORK

## 2017-08-04 NOTE — PLAN OF CARE
Problem: Goal Outcome Summary  Goal: Goal Outcome Summary  OT:  eval complete and treatment initiated.  Pt is a 61 year old female with history of HTN, tobacco use disorder and depression who was admitted on 8/1/2017 after a dog bite on her left elbow with cellulitis.   Pt underwent L elbow arthroscopic debridement 8/3/17.  She reported to be independent in ADLs, IADLs, mobility and working full time PTA.     Discharge Planner OT   Patient plan for discharge: home with assist of Son and Nephew  Current status: Pt alert, oriented and able to follow commands.  Pt mod I in supine<>sit coming EOB using bed rails.  Sit<>stand, functional mobility during ADLs and toilet transfer/activity completed independently.  Pt back to EOB for education in one handed dressing technique.  Pt needed mod A for UE including donning sling.  Min A for LE dressing.  OT issued handout and provided education in L UE ROM HEP.  Pt tolerated pro/supination, wrist flex/extension, radial/ulnar deviation and finger flex/extension x 10 reps each.  Pt unable to complete ROM of L elbow secondary to being limited by ACE wrap.  Barriers to return to prior living situation: none  Recommendations for discharge: home with assist of family  Rationale for recommendations: Pt independent in mobility and tolerating ROM well.  Would benefit from one more OT session for education in elbow ROM and one handed dressing technique.       Entered by: Sweetie Farr 08/04/2017 4:28 PM

## 2017-08-04 NOTE — PROGRESS NOTES
[facility]  Daily Orthopedic Post-Op Note         Assessment and Plan:    Assessment:   Post-operative day #1  Procedure: left elbow arthroscopic debridement of infected dog bite   Doing well.  No excessive bleeding  Pain well-controlled.  Pain: 3/10      Plan:   -Ambulate  -Continue supportive and symptomatic treatment  -Start or continue physical therapy  -Pain control measures  Physical Therapy    hemovac drain was removed  Watch cultures and adjust antibiotics accordingly  Dressing changes with wound care team--education on home wound care at time of discharge     Assessment:  Stable post op left elbow arthroscopic I&D  Plan:  Mobilize  Disposition: Home  Anticipated date of discharge: 1-2 days from now         Interval History:   Doing well.  Continues to improve.  Pain is well-controlled.  No fevers.                     Physical Exam:   137/69, 98.4, 80, 16  Dressing clean, dry and intact  Calves soft and non tender  Distal neurovascular exam normal           Data:      Hemoglobin: Hemoglobin   Date Value Ref Range Status   08/03/2017 12.2 11.7 - 15.7 g/dL Final   08/01/2017 14.2 11.7 - 15.7 g/dL Final       Mary Corey PA-C   [phone]

## 2017-08-04 NOTE — PLAN OF CARE
Problem: Goal Outcome Summary  Goal: Goal Outcome Summary  Outcome: Improving  Afebrile, VSS. Returned from PACU at 1730. Pt awake, alert, on 2 LPM of O2 via NC to maintain sats 93-96%. Lungs clear, pt taking deep breaths with vital signs. L arm in ace bandage with hemovac suction. Hemovac putting out 30cc's of bloody drainage for shift. L arm elevated and ice applied. CMS of fingers intact. Fingers warm with brisk cap refill. Pt has denied any discomfort this shift, arm feels numb and difficult to move. Splint on. Arm supported when ambulating to BR, will see OT in am. Sid cl liqs, no N/V. Able to void x1.

## 2017-08-04 NOTE — PROGRESS NOTES
08/04/17 1307   Quick Adds   Type of Visit Initial Occupational Therapy Evaluation   Living Environment   Lives With child(jh), adult;other (see comments)  (Son and Nephew)   Living Arrangements other (see comments)  ([facility])   Home Accessibility stairs within home   Number of Stairs Within Home 12   Transportation Available car;family or friend will provide   Living Environment Comment Pt reported to be independent in mobility at baseline.  Son and Nephew are home and able to assist with IADLs.   Self-Care   Dominant Hand right   Usual Activity Tolerance good   Current Activity Tolerance fair   Regular Exercise no   Equipment Currently Used at Home none   Activity/Exercise/Self-Care Comment Pt reported to be independent in ADLs/IADLs and working PTA.   Functional Level Prior   Ambulation 0-->independent   Transferring 0-->independent   Toileting 0-->independent   Bathing 0-->independent   Dressing 0-->independent   Eating 0-->independent   Communication 0-->understands/communicates without difficulty   Swallowing 0-->swallows foods/liquids without difficulty   Cognition 0 - no cognition issues reported   Fall history within last six months no   Which of the above functional risks had a recent onset or change? none       Present no   General Information   Onset of Illness/Injury or Date of Surgery - Date 08/01/17   Referring Physician Dr. Roldan Stewart   Patient/Family Goals Statement Return to home.   Additional Occupational Profile Info/Pertinent History of Current Problem Pt is a 61 year old female with history of HTN, tobacco use disorder and depression who was admitted on 8/1/2017 after a dog bite on her left elbow with cellulitis.   Pt underwent L elbow arthroscopic debridement 8/3/17.   Precautions/Limitations no known precautions/limitations   Weight-Bearing Status - LUE weight-bearing as tolerated   General Info Comments Per MD, may begin range of motion to the elbow as tolerated  with hand and wrist motion with anti edema measures   Cognitive Status Examination   Orientation orientation to person, place and time   Level of Consciousness alert   Able to Follow Commands WNL/WFL   Personal Safety (Cognitive) WNL/WFL   Memory intact   Attention No deficits were identified   Organization/Problem Solving No deficits were identified   Executive Function No deficits were identified   Visual Perception   Visual Perception Wears glasses   Sensory Examination   Sensory Quick Adds No deficits were identified   Pain Assessment   Patient Currently in Pain Yes, see Vital Sign flowsheet   Integumentary/Edema   Integumentary/Edema other (describe)   Integumentary/Edema Comments L UE   Posture   Posture not impaired   Range of Motion (ROM)   ROM Quick Adds Other (describe)   ROM Comment R UE AROM WFL.  L limited throughout with ACE wrap in place.   Strength   Manual Muscle Testing Quick Adds Other   Strength Comments R UE WFL.  L UE NT.   Hand Strength   Hand Strength Comments R WFL.  L NT.   Muscle Tone Assessment   Muscle Tone Quick Adds No deficits were identified   Coordination   Upper Extremity Coordination Left UE impaired   Mobility   Bed Mobility Comments mod I   Transfer Skill: Bed to Chair/Chair to Bed   Level of [place]: Bed to Chair independent   Transfer Skill: Sit to Stand   Level of [place]: Sit/Stand independent   Transfer Skill: Toilet Transfer   Level of [place]: Toilet independent   Upper Body Dressing   Level of [place]: Dress Upper Body moderate assist (50% patients effort)  (including sling)   Physical Assist/Nonphysical Assist: Dress Upper Body set-up required   Lower Body Dressing   Level of [place]: Dress Lower Body minimum assist (75% patients effort)   Physical Assist/Nonphysical Assist: Dress Lower Body set-up required   Toileting   Level of [place]: Toilet independent   Grooming   Level of [place]: Grooming independent   Eating/Self Feeding  "  Level of [place]: Eating independent   Instrumental Activities of Daily Living (IADL)   Previous Responsibilities meal prep;housekeeping;laundry;shopping;medication management;finances;driving;work   Activities of Daily Living Analysis   Impairments Contributing to Impaired Activities of Daily Living pain;post surgical precautions;ROM decreased;strength decreased;coordination impaired   General Therapy Interventions   Planned Therapy Interventions ROM;ADL retraining   Clinical Impression   Criteria for Skilled Therapeutic Interventions Met yes, treatment indicated   OT Diagnosis decreased ADL performance   Influenced by the following impairments L elbow arthroscopic debridement for cellulitis s/p dog bite   Assessment of Occupational Performance 5 or more Performance Deficits   Identified Performance Deficits Pt with decreased ability to dress, bathe, cook, drive, return to work   Clinical Decision Making (Complexity) Low complexity   Therapy Frequency daily   Predicted Duration of Therapy Intervention (days/wks) 2 days   Anticipated Discharge Disposition Home with Assist   Risks and Benefits of Treatment have been explained. Yes   Patient, Family & other staff in agreement with plan of care Yes   [facility] TM \"6 Clicks\"   2016, Trustees of [facility], under license to [employer].  All rights reserved.   6 Clicks Short Forms Daily Activity Inpatient Short Form   [facility]  \"6 Clicks\" Daily Activity Inpatient Short Form   1. Putting on and taking off regular lower body clothing? 3 - A Little   2. Bathing (including washing, rinsing, drying)? 3 - A Little   3. Toileting, which includes using toilet, bedpan or urinal? 4 - None   4. Putting on and taking off regular upper body clothing? 2 - A Lot   5. Taking care of personal grooming such as brushing teeth? 4 - None   6. Eating meals? 4 - None   Daily Activity Raw Score (Score out of 24.Lower scores equate to lower levels of " function) 20   Total Evaluation Time   Total Evaluation Time (Minutes) 10

## 2017-08-04 NOTE — PLAN OF CARE
Problem: Goal Outcome Summary  Goal: Goal Outcome Summary  Afebrile. Pain controlled on Oxycodone and Toradol. Received Dilaudid times two for drain removal and dressing change. Mild to moderate amount of swelling in left hand; elevated on pillows and encouraged to move fingers. Denies numbness or tingling. WOCN here to change dressing. Tolerating regular diet. Voiding. Ambulating in room independently and up with OT. Continues to receive IV antibiotics. Cultures pending. Will continue to monitor and provide for needs.

## 2017-08-04 NOTE — PLAN OF CARE
Problem: Goal Outcome Summary  Goal: Goal Outcome Summary  Outcome: No Change  Afebrile. VSS. Denies pain. Scheduled Toradol and Tylenol. LUE elevated on pillows with sling. Ice applied. Dressing C/D/I. Hemovac draining bright red bloody drainage. Block wearing off and able to move fingers and hand. Continues with numbness and tingling. Voiding. Assist of 1. Tolerating clears, advanced to regular for AM per patient request. Receiving Unasyn. Appeared to sleep comfortably between cares.

## 2017-08-04 NOTE — PROGRESS NOTES
"[facility]  Hospitalist Progress Note  Patient Name: Sarah Ulrich    MRN: [id number]  Provider: Crow Marquez MD  08/04/17    Initial presenting complaint/issue to hospital (Diagnosis): dog bite         Assessment and Plan:      Summary of Stay: Sarah Ulrich is a 61 year old female with history of HTN, tobacco use disorder and depression who was admitted on 8/1/2017 after a dog bite on her left elbow with cellulitis.  She was treated with Unasyn but did not improve.  Orthopedic surgery was consulted and took patient to the OR on 8/3/17 for incision and drainage.       Problem List/Assessment and Plan:       1. Left Arm Cellulitis due to Dog Bite: Not improving much.  Patient had I&D in the OR on 8/3.  Wound care nurse following. Drain out today.       2. HTN: continue PTA Lisinopril.      3. Tobacco Use Disorder: NRT ordered.      4. Depression: Continue PTA Zoloft.      5. Hyponatremia: Resolved with IVF.      6. Alcohol Use: Last drink was on Monday.  No evidence of withdrawal here.        DVT Prophylaxis: Pneumatic Compression Devices  Code Status: Full Code  Discharge Dispo: Home in 1-2 days is likely          Interval History:      Pain is controlled. No new problems.                   Physical Exam:      Last Vital Signs:  /68  Pulse 80  Temp 98.1  F (36.7  C) (Oral)  Resp 16  Ht 1.575 m (5' 2\")  Wt 49.1 kg (108 lb 4.8 oz)  SpO2 90%  BMI 19.81 kg/m2    Intake/Output Summary (Last 24 hours) at 08/04/17 1700  Last data filed at 08/04/17 1221   Gross per 24 hour   Intake          2578.67 ml   Output               30 ml   Net          2548.67 ml       GENERAL:  Comfortable. Cooperative.  PSYCH: pleasant, oriented, No acute distress.  EYES: PERRLA, Normal conjunctiva.  HEART:  Regular rate and rhythm. No JVD. Pulses normal. No edema.  LUNGS:  Clear to auscultation, normal Respiratory effort.  ABDOMEN:  Soft, no hepatosplenomegaly, normal bowel sounds.  EXTREMETIES: No clubbing, cyanosis " or ischemia  SKIN:  Dry to touch, No rash.           Medications:      All current medications were reviewed.         Data:      All new lab and imaging data was reviewed.   Labs:    Recent Labs  Lab 08/03/17  1441 08/03/17  1433 08/01/17  1630 08/01/17  1547   CULT Culture negative monitoring continues  Culture negative monitoring continues  Canceled, Test credited Test reordered as correct code REORDERED AS A FLUID CULTURE Culture negative monitoring continues  Culture in progress No growth after 3 days No growth after 3 days          Lab Results   Component Value Date     08/03/2017     08/02/2017     08/01/2017    Lab Results   Component Value Date    CHLORIDE 100 08/03/2017    CHLORIDE 104 08/02/2017    CHLORIDE 91 08/01/2017    Lab Results   Component Value Date    BUN 2 08/03/2017    BUN 6 08/02/2017    BUN 7 08/01/2017      Lab Results   Component Value Date    POTASSIUM 3.4 08/03/2017    POTASSIUM 3.9 08/02/2017    POTASSIUM 4.3 08/01/2017    Lab Results   Component Value Date    CO2 25 08/03/2017    CO2 25 08/02/2017    CO2 25 08/01/2017    Lab Results   Component Value Date    CR 0.39 08/03/2017    CR 0.40 08/02/2017    CR 0.60 08/01/2017          Recent Labs  Lab 08/03/17  0610 08/01/17  1547   WBC 6.5 10.5   HGB 12.2 14.2   HCT 36.5 42.0   MCV 92 90    244

## 2017-08-04 NOTE — PROGRESS NOTES
[facility] Nurse Inpatient Wound Assessment     Assessment of wound(s) on pt's:   Left Elbow        Data:   Patient History:      per MD note(s): Sarah Ulrich is a 61 year old female with history of HTN, tobacco use disorder and depression who was admitted on 2017 after a dog bite on her left elbow with cellulitis.  She was treated with Unasyn but did not improve.  Orthopedic surgery was consulted and took patient to the OR on 8/3/17 for incision and drainage.      Moisture Management:  continent    Catheter secured? Not applicable    Current Diet / Nutrition:         Yunior Assessment and sub scores:   Yunior Score  Av.3  Min: 18  Max: 21     Labs:         Recent Labs   Lab Test  17   0610  17   1547   HGB  12.2  14.2   RBC  3.97  4.66   WBC  6.5  10.5   PLT  246  244   CRP   --   156.0*          Wound Assessment (location #1):   Left Elbow  Wound History:  Pt was bite by a dog and was taken to OR for incision and drainage    Specific Dimensions (length x width x depth, in cm):   Upper wound: 1.8cm x 1.0cm x 3.5cm                                                                                           Lower wound: 0.4cm x 1.5cm x 1.8cm    Upper wound undermines from 8 O'clock to 12 O'clock     Lower wound undermines 10 O'clock to 2 O'clock    Unable to visualize wound bed dt depth and small opening, draining large amt of serosanguinous drainage. Pt has 2  sutures are clean, dry and intact in between both upper and lower wounds. Periwound is ecchymotic and slightly purplish in color and is warm to the touch. Left elbow is swollen and painful, pt tolerates wound care with premedication and breaks.            Intervention:     Patient's chart evaluated.      Wound(s) was assessed    Wound Care: was done: supplies gathered, cleansed with microklenz, packed with iodoform strip gauze, covered with adaptic and abd, secured with ace wrap    Orders  Written    Supplies  Reviewed Pt  will need: Microklenz, iodoform strip gauze, cotton tip applicator, vaseline gauze, abd, kerlix, tape and ace wraps for wound care changes and at DC.    Discussed plan of care with Patient and Nurse          Assessment:       Pt has traumatic wound to left elbow with no acute or obvious signs or symptoms of infection. Pt is at high risk for infection with the nature of dog bites. Pt has family that is willing to assist with wound care and will need to coordinate next dressing change so family member is present. Will loosely pack wounds with iodoform strip gauze, cover with vaseline gauze, cover with absorbant dressing and change 2x daily and PRN.          Plan:     Nursing to notify the Provider(s) and re-consult the [facility] Nurse if wound(s) deteriorate(s) or if the wound care plan needs reevaluation.    Plan of care for wound located on Left Elbow:     1. Remove old dressing spraying with microklenz or water to prevent sticking.    2. Flush wound with microklenz spray and allow it to sit in wound bed for 3 to 5mins.     3. Take a strip of gauze and wick out any extra microklenz.     4. Loosely pack wound with Iodoform strip gauze. Fluff strip gauze into wound bed ensuring not to pack tight to allow new healthy tissue to grow. Approx 4inches for bottom wound and 7inches for top  Wound. As wound heals apply less strip gauze. Ensure a tail of gauze is left outside wound to prevent gauze from slipping into wound and to help wick drainage out.    5. Cover with vaseline gauze to prevent sticking    6. Cover with absorbant dressing such as 4x4 or ABD depending on drainage.    7. Secure with kerlix and tape.    8. Apply ace wrap from wrist to upper bicep, ensuring it is not wrapped to tight, checking for color, motion, sensitivity in hand.    9. Change dressing 2x/day and PRN.     [facility] Nurse will return: Monday and PRN     Face to face time: 60 Minutes

## 2017-08-05 PROCEDURE — 25000128 H RX IP 250 OP 636: Performed by: ORTHOPAEDIC SURGERY

## 2017-08-05 PROCEDURE — 99232 SBSQ HOSP IP/OBS MODERATE 35: CPT | Performed by: INTERNAL MEDICINE

## 2017-08-05 PROCEDURE — 25000132 ZZH RX MED GY IP 250 OP 250 PS 637: Performed by: ORTHOPAEDIC SURGERY

## 2017-08-05 PROCEDURE — 12000011 ZZH R&B MS OVERFLOW

## 2017-08-05 RX ADMIN — ACETAMINOPHEN 975 MG: 325 TABLET, FILM COATED ORAL at 08:24

## 2017-08-05 RX ADMIN — LISINOPRIL 10 MG: 10 TABLET ORAL at 08:24

## 2017-08-05 RX ADMIN — NICOTINE 1 PATCH: 21 PATCH, EXTENDED RELEASE TRANSDERMAL at 08:22

## 2017-08-05 RX ADMIN — SERTRALINE HYDROCHLORIDE 150 MG: 100 TABLET ORAL at 08:26

## 2017-08-05 RX ADMIN — OXYCODONE HYDROCHLORIDE 10 MG: 5 TABLET ORAL at 04:19

## 2017-08-05 RX ADMIN — OXYCODONE HYDROCHLORIDE 10 MG: 5 TABLET ORAL at 20:06

## 2017-08-05 RX ADMIN — Medication 0.5 MG: at 19:05

## 2017-08-05 RX ADMIN — OXYCODONE HYDROCHLORIDE 10 MG: 5 TABLET ORAL at 08:24

## 2017-08-05 RX ADMIN — AMPICILLIN SODIUM AND SULBACTAM SODIUM 3 G: 2; 1 INJECTION, POWDER, FOR SOLUTION INTRAMUSCULAR; INTRAVENOUS at 13:51

## 2017-08-05 RX ADMIN — SENNOSIDES AND DOCUSATE SODIUM 1 TABLET: 8.6; 5 TABLET ORAL at 20:02

## 2017-08-05 RX ADMIN — THERA TABS 1 TABLET: TAB at 08:24

## 2017-08-05 RX ADMIN — SENNOSIDES AND DOCUSATE SODIUM 1 TABLET: 8.6; 5 TABLET ORAL at 08:24

## 2017-08-05 RX ADMIN — GABAPENTIN 300 MG: 300 CAPSULE ORAL at 13:51

## 2017-08-05 RX ADMIN — Medication 0.5 MG: at 09:45

## 2017-08-05 RX ADMIN — FOLIC ACID 1 MG: 1 TABLET ORAL at 08:26

## 2017-08-05 RX ADMIN — OXYCODONE HYDROCHLORIDE 10 MG: 5 TABLET ORAL at 17:09

## 2017-08-05 RX ADMIN — Medication 1 MG: at 00:01

## 2017-08-05 RX ADMIN — ACETAMINOPHEN 975 MG: 325 TABLET, FILM COATED ORAL at 23:51

## 2017-08-05 RX ADMIN — AMPICILLIN SODIUM AND SULBACTAM SODIUM 3 G: 2; 1 INJECTION, POWDER, FOR SOLUTION INTRAMUSCULAR; INTRAVENOUS at 02:14

## 2017-08-05 RX ADMIN — GABAPENTIN 300 MG: 300 CAPSULE ORAL at 08:26

## 2017-08-05 RX ADMIN — Medication 100 MG: at 08:24

## 2017-08-05 RX ADMIN — ACETAMINOPHEN 975 MG: 325 TABLET, FILM COATED ORAL at 15:51

## 2017-08-05 RX ADMIN — GABAPENTIN 300 MG: 300 CAPSULE ORAL at 20:02

## 2017-08-05 RX ADMIN — AMPICILLIN SODIUM AND SULBACTAM SODIUM 3 G: 2; 1 INJECTION, POWDER, FOR SOLUTION INTRAMUSCULAR; INTRAVENOUS at 08:20

## 2017-08-05 RX ADMIN — AMPICILLIN SODIUM AND SULBACTAM SODIUM 3 G: 2; 1 INJECTION, POWDER, FOR SOLUTION INTRAMUSCULAR; INTRAVENOUS at 20:02

## 2017-08-05 RX ADMIN — OXYCODONE HYDROCHLORIDE 10 MG: 5 TABLET ORAL at 13:51

## 2017-08-05 NOTE — PROGRESS NOTES
"[facility]  Hospitalist Progress Note  Patient Name: Sarah Ulrich    MRN: [id number]  Provider: Crow Marquez MD  08/05/17    Initial presenting complaint/issue to hospital (Diagnosis): dog bite         Assessment and Plan:      Summary of Stay: Sarah Ulrich is a 61 year old female with history of HTN, tobacco use disorder and depression who was admitted on 8/1/2017 after a dog bite on her left elbow with cellulitis.  She was treated with Unasyn but did not improve.  Orthopedic surgery was consulted and took patient to the OR on 8/3/17 for incision and drainage.       Problem List/Assessment and Plan:       1. Left Arm Cellulitis due to Dog Bite: Was not improving much so patient had I&D in the OR on 8/3.  Wound care nurse following. Drain out on 8/4.  Clinically improving with less pain and swelling.        2. HTN: continue PTA Lisinopril.      3. Tobacco Use Disorder: NRT ordered.      4. Depression: Continue PTA Zoloft.      5. Hyponatremia: Resolved with IVF.      6. Alcohol Use: Last drink was on Monday.  No evidence of withdrawal here.        DVT Prophylaxis: Pneumatic Compression Devices  Code Status: Full Code  Discharge Dispo:  Defer discharge timing to Ortho- likely today or tomorrow.         Interval History:      Pain is controlled. No new problems                   Physical Exam:      Last Vital Signs:  /79  Pulse 80  Temp 98.3  F (36.8  C) (Axillary)  Resp 16  Ht 1.575 m (5' 2\")  Wt 49.1 kg (108 lb 4.8 oz)  SpO2 94%  BMI 19.81 kg/m2    Intake/Output Summary (Last 24 hours) at 08/05/17 0842  Last data filed at 08/04/17 2319   Gross per 24 hour   Intake             2070 ml   Output                0 ml   Net             2070 ml       GENERAL:  Comfortable. Cooperative.  PSYCH: pleasant, oriented, No acute distress.  EYES: PERRLA, Normal conjunctiva.  HEART:  Regular rate and rhythm. No JVD. Pulses normal. No edema.  LUNGS:  Clear to auscultation, normal Respiratory " effort.  ABDOMEN:  Soft, no hepatosplenomegaly, normal bowel sounds.  EXTREMETIES: No clubbing, cyanosis or ischemia.  Left arm wrapped. Left hand with less swelling.   SKIN:  Dry to touch, No rash.           Medications:      All current medications were reviewed.         Data:      All new lab and imaging data was reviewed.   Labs:    Recent Labs  Lab 08/03/17  1441 08/03/17  1433 08/01/17  1630 08/01/17  1547   CULT Culture negative monitoring continues  Culture negative monitoring continues  Canceled, Test credited Test reordered as correct code REORDERED AS A FLUID CULTURE Culture negative monitoring continues  Culture in progress No growth after 4 days No growth after 4 days          Lab Results   Component Value Date     08/03/2017     08/02/2017     08/01/2017    Lab Results   Component Value Date    CHLORIDE 100 08/03/2017    CHLORIDE 104 08/02/2017    CHLORIDE 91 08/01/2017    Lab Results   Component Value Date    BUN 2 08/03/2017    BUN 6 08/02/2017    BUN 7 08/01/2017      Lab Results   Component Value Date    POTASSIUM 3.4 08/03/2017    POTASSIUM 3.9 08/02/2017    POTASSIUM 4.3 08/01/2017    Lab Results   Component Value Date    CO2 25 08/03/2017    CO2 25 08/02/2017    CO2 25 08/01/2017    Lab Results   Component Value Date    CR 0.39 08/03/2017    CR 0.40 08/02/2017    CR 0.60 08/01/2017          Recent Labs  Lab 08/03/17  0610 08/01/17  1547   WBC 6.5 10.5   HGB 12.2 14.2   HCT 36.5 42.0   MCV 92 90    244

## 2017-08-05 NOTE — PROGRESS NOTES
Orthopedics Progress Note    61-year old female POD#2 Left elbow irrigation and debridement for dog bites. Today Sarah states she has minimal pain at rest. She states she can gently move her elbow with minimal pain.   Afebrile. VSS. Incisions show no erythema, ecchymosis as expected. Sutures in place. Packing is appropriate.     Plan is to continue wound cares. Will defer oral antibiotic to primary team once susceptibilities return. Follow-up next week with Dr. Stewart. Ok to discharge once outpatient antibiotic regimen in place.     Ganesh Rios PA-C

## 2017-08-05 NOTE — PLAN OF CARE
Problem: Goal Outcome Summary  Goal: Goal Outcome Summary  OT: Attempted to see pt as scheduled this AM, pt sleepy and requested OT to check back later today or tomorrow. Will check back later as schedule allows

## 2017-08-05 NOTE — PLAN OF CARE
Problem: Goal Outcome Summary  Goal: Goal Outcome Summary  Outcome: No Change  Vss afebrile  LS:clear. Non prod cough  GI:bs +   : voiding in adequate amounts  Skin: left elbow bite wound. drsg changed with sero sang drainage  Activity:up indep   Pain: oxycodone given with good relief and dilaudid ivp for drsg change. Pain with packing.  Plan: iv abx. drsg changed bid. Son to come watch drsg change tonight.

## 2017-08-05 NOTE — PLAN OF CARE
Problem: Goal Outcome Summary  Goal: Goal Outcome Summary  Outcome: No Change  Afebrile. VSS. Pain rated 5-6/10 controlled with Oxycodone and scheduled Tylenol. Elevated left arm on pillows, ice applied. Continues with swelling in left hand. CMS intact. Denies numbness or tingling. Dressing C/D/I. Voiding. Tolerating regular diet. Receiving Unasyn. Appeared to sleep comfortably between cares.

## 2017-08-05 NOTE — PLAN OF CARE
Problem: Goal Outcome Summary  Goal: Goal Outcome Summary  Outcome: Improving  Pain controlled with PO oxycodone and scheduled PO Tylenol. IV Dilaudid given prior to dressing change. Extremities cool to touch and pale at beginning of shift. All extremities warm with no discoloration when reassessed. Capillary refill intact for duration of shift. Dressing change preformed per WOC nurse orders. Tolerating regular diet. Walking hallways. Voiding. Nicotine patch in place. Will continue to monitor and provide for cares.

## 2017-08-06 ENCOUNTER — APPOINTMENT (OUTPATIENT)
Dept: OCCUPATIONAL THERAPY | Facility: CLINIC | Age: 62
DRG: 580 | End: 2017-08-06
Payer: COMMERCIAL

## 2017-08-06 VITALS
HEIGHT: 62 IN | HEART RATE: 80 BPM | OXYGEN SATURATION: 94 % | BODY MASS INDEX: 19.93 KG/M2 | WEIGHT: 108.3 LBS | TEMPERATURE: 98.3 F | DIASTOLIC BLOOD PRESSURE: 81 MMHG | SYSTOLIC BLOOD PRESSURE: 149 MMHG | RESPIRATION RATE: 16 BRPM

## 2017-08-06 LAB
BACTERIA SPEC CULT: NORMAL
Lab: NORMAL
MICRO REPORT STATUS: NORMAL
SPECIMEN SOURCE: NORMAL

## 2017-08-06 PROCEDURE — 25000132 ZZH RX MED GY IP 250 OP 250 PS 637: Performed by: ORTHOPAEDIC SURGERY

## 2017-08-06 PROCEDURE — 90732 PPSV23 VACC 2 YRS+ SUBQ/IM: CPT | Performed by: INTERNAL MEDICINE

## 2017-08-06 PROCEDURE — 99238 HOSP IP/OBS DSCHRG MGMT 30/<: CPT | Performed by: INTERNAL MEDICINE

## 2017-08-06 PROCEDURE — 40000133 ZZH STATISTIC OT WARD VISIT: Performed by: REHABILITATION PRACTITIONER

## 2017-08-06 PROCEDURE — 25000128 H RX IP 250 OP 636: Performed by: INTERNAL MEDICINE

## 2017-08-06 PROCEDURE — 97110 THERAPEUTIC EXERCISES: CPT | Mod: GO | Performed by: REHABILITATION PRACTITIONER

## 2017-08-06 PROCEDURE — 25000128 H RX IP 250 OP 636: Performed by: ORTHOPAEDIC SURGERY

## 2017-08-06 RX ORDER — POLYETHYLENE GLYCOL 3350 17 G/17G
1 POWDER, FOR SOLUTION ORAL DAILY
Qty: 24 PACKET | Refills: 0 | Status: SHIPPED | OUTPATIENT
Start: 2017-08-06 | End: 2017-08-30

## 2017-08-06 RX ADMIN — OXYCODONE HYDROCHLORIDE 10 MG: 5 TABLET ORAL at 11:04

## 2017-08-06 RX ADMIN — SENNOSIDES AND DOCUSATE SODIUM 2 TABLET: 8.6; 5 TABLET ORAL at 07:55

## 2017-08-06 RX ADMIN — AMPICILLIN SODIUM AND SULBACTAM SODIUM 3 G: 2; 1 INJECTION, POWDER, FOR SOLUTION INTRAMUSCULAR; INTRAVENOUS at 07:39

## 2017-08-06 RX ADMIN — OXYCODONE HYDROCHLORIDE 10 MG: 5 TABLET ORAL at 00:25

## 2017-08-06 RX ADMIN — AMPICILLIN SODIUM AND SULBACTAM SODIUM 3 G: 2; 1 INJECTION, POWDER, FOR SOLUTION INTRAMUSCULAR; INTRAVENOUS at 01:57

## 2017-08-06 RX ADMIN — Medication 100 MG: at 07:55

## 2017-08-06 RX ADMIN — OXYCODONE HYDROCHLORIDE 10 MG: 5 TABLET ORAL at 05:20

## 2017-08-06 RX ADMIN — SERTRALINE HYDROCHLORIDE 150 MG: 100 TABLET ORAL at 07:55

## 2017-08-06 RX ADMIN — Medication 1 MG: at 01:06

## 2017-08-06 RX ADMIN — HYDROXYZINE HYDROCHLORIDE 25 MG: 25 TABLET ORAL at 07:55

## 2017-08-06 RX ADMIN — THERA TABS 1 TABLET: TAB at 07:55

## 2017-08-06 RX ADMIN — LISINOPRIL 10 MG: 10 TABLET ORAL at 07:54

## 2017-08-06 RX ADMIN — ACETAMINOPHEN 975 MG: 325 TABLET, FILM COATED ORAL at 07:39

## 2017-08-06 RX ADMIN — GABAPENTIN 300 MG: 300 CAPSULE ORAL at 07:55

## 2017-08-06 RX ADMIN — PNEUMOCOCCAL VACCINE POLYVALENT 0.5 ML
25; 25; 25; 25; 25; 25; 25; 25; 25; 25; 25; 25; 25; 25; 25; 25; 25; 25; 25; 25; 25; 25; 25 INJECTION, SOLUTION INTRAMUSCULAR; SUBCUTANEOUS at 11:04

## 2017-08-06 RX ADMIN — FOLIC ACID 1 MG: 1 TABLET ORAL at 07:55

## 2017-08-06 RX ADMIN — OXYCODONE HYDROCHLORIDE 10 MG: 5 TABLET ORAL at 08:02

## 2017-08-06 NOTE — PLAN OF CARE
Problem: Goal Outcome Summary  Goal: Goal Outcome Summary  Outcome: No Change  Afebrile. VSS. Pain controlled with tylenol, oxycodone, and one dose of dilaudid used before dressing change. Son and his wife present for dressing change teaching tonight, new dressing C,D,I. Left arm elevated on pillows, mild swelling still present in hand. New IV SL. Continue with unasyn. Patient took shower tonight. Tolerating regular diet. Will continue to monitor and provide for needs.

## 2017-08-06 NOTE — PROGRESS NOTES
Afebrile.  Pain managed on oral pain medication.  Left arm dressing changed.  Small amount of serosanguinous drainage present.  Wound packed per care plan.  Sarah verbalized understanding of steps for dressing change.  She plans to  her son and his girlfriend through the dressing changes.  They were present for dressing change last evening.  Reviewed plan for follow-up, dressing changes at home, and discharge medications.  Sarah is comfortable with discharge plan.  Discharged home with son.

## 2017-08-06 NOTE — DISCHARGE INSTRUCTIONS
Wound care 2 TIMES DAILY     Comments: Plan of care for wound located on Left Elbow:   1. Remove old dressing spraying with microklenz or water to prevent sticking.   2. Flush wound with microklenz spray and allow it to sit in wound bed for 3 to 5mins.   3. Take a strip of gauze and wick out any extra microklenz.   4. Loosely pack wound with Iodoform strip gauze. Fluff strip gauze into wound bed ensuring not to pack tight to allow new healthy tissue to grow. Approx 4inches for bottom wound and 7inches for top  Wound. As wound heals apply less strip gauze. Ensure a tail of gauze is left outside wound to prevent gauze from slipping into wound and to help wick drainage out.   5. Cover with vaseline gauze to prevent sticking   6. Cover with absorbant dressing such as 4x4 or ABD depending on drainage.   7. Secure with kerlix and tape.   8. Apply ace wrap from wrist to upper bicep, ensuring it is not wrapped to tight, checking for color, motion, sensitivity in hand.   9. Change dressing 2x/day and as needed.

## 2017-08-06 NOTE — DISCHARGE SUMMARY
"Discharge Summary    Sarah Ulrich MRN# [id number]   YOB: 1955 Age: 61 year old     Date of Admission:  8/1/2017  Date of Discharge:  8/6/2017  Admitting Physician:  Bam Silva DO  Discharge Physician:  Crow Marquez MD  Discharging Service:  Hospitalist     Home clinic:  [facility], [facility]          Discharge Diagnosis:   1. Left arm cellulitis with possible septic arthritis of elbow due to dog bite    2.  S/p surgical incision and drainage of left elbow      3. Hypertension      3. Tobacco use disorder.      4. Depression.      5. Hyponatremia.      6. Alcohol use.              Discharge Disposition:   Discharged to home           Allergies:   No Known Allergies             Condition on Discharge:   Discharge condition: Stable   Discharge vitals: Blood pressure 149/81, pulse 80, temperature 98.3  F (36.8  C), temperature source Oral, resp. rate 16, height 1.575 m (5' 2\"), weight 49.1 kg (108 lb 4.8 oz), SpO2 94 %.   Code status on discharge: Full Code   Physical exam on day of discharge:   GENERAL:  Comfortable. Cooperative.  PSYCH: pleasant, oriented, No acute distress.  EYES: PERRLA, Normal conjunctiva.  HEART:  Regular rate and rhythm. No JVD. Pulses normal. No edema.  LUNGS:  Clear to auscultation, normal Respiratory effort.  ABDOMEN:  Soft, no hepatosplenomegaly, normal bowel sounds.  EXTREMETIES: No clubbing, cyanosis or ischemia.  Left hand and arm swelling and erythema and swelling are improving  SKIN:  Dry to touch, No rash.         History of Present Illness and Hospital Course:     See detailed admission note for full details.  Sarah Ulrich is a 61 year old female with history of HTN, tobacco use disorder and depression who was admitted on 8/1/2017 after a dog bite on her left elbow with cellulitis.  She was treated with Unasyn but did not improve.  Orthopedic surgery was consulted and took patient to the OR on 8/3/17 for incision and drainage. Cultures have not grown " anything but Sarah has improved on Unasyn.  She will discharge home today with 10 days of Augmentin, wound care orders, pain medications, Miralax, and Orthopedic Surgery follow up in clinic in 4 days.            Procedures / Imaging:   I&D of left elbow           Consultations:   Consultation during this admission received from orthopedics             Pending Results:   Surgical cultures           Discharge Instructions and Follow-Up:   Discharge diet: Regular   Discharge activity: Activity as tolerated, left arm restrictions per Ortho   Discharge follow-up: Follow up with Dr. Stewart in 4 days   Outpatient therapy: None    Other instructions: Wound care instructions provided             Discharge Medications:   Current Discharge Medication List      START taking these medications    Details   polyethylene glycol (MIRALAX/GLYCOLAX) Packet Take 17 g by mouth daily for 24 days  Qty: 24 packet, Refills: 0    Comments: Hold for loose stools  Associated Diagnoses: Constipation, unspecified constipation type      oxyCODONE (ROXICODONE) 5 MG IR tablet Take 1-2 tablets (5-10 mg) by mouth every 4 hours as needed for moderate to severe pain  Qty: 40 tablet, Refills: 0    Associated Diagnoses: Infection of left elbow (H)      acetaminophen (TYLENOL) 325 MG tablet Take 2 tablets (650 mg) by mouth every 4 hours as needed for other (surgical pain)  Qty: 100 tablet, Refills: 0    Associated Diagnoses: Infection of left elbow (H)      hydrOXYzine (ATARAX) 25 MG tablet Take 1 tablet (25 mg) by mouth every 6 hours as needed for itching  Qty: 30 tablet, Refills: 0    Associated Diagnoses: Infection of left elbow (H)         CONTINUE these medications which have CHANGED    Details   amoxicillin-clavulanate (AUGMENTIN) 875-125 MG per tablet Take 1 tablet by mouth 2 times daily for 10 days For 7 days starting 7/30/17  Qty: 20 tablet, Refills: 0    Associated Diagnoses: Cellulitis of other specified site; Dog bite of left elbow, initial  encounter; Infection of left elbow (H)         CONTINUE these medications which have NOT CHANGED    Details   fluticasone (FLONASE) 50 MCG/ACT spray Spray 1 spray into both nostrils daily as needed for rhinitis or allergies      gabapentin (NEURONTIN) 300 MG capsule Take 300 mg by mouth 3 times daily      lisinopril (PRINIVIL/ZESTRIL) 10 MG tablet Take 10 mg by mouth daily      sertraline (ZOLOFT) 100 MG tablet Take 150 mg by mouth daily . 1.5 tabs daily         STOP taking these medications       HYDROcodone-acetaminophen (NORCO) 5-325 MG per tablet Comments:   Reason for Stopping:                  Total time spent in face to face contact with the patient and coordinating discharge was:  25 Minutes

## 2017-08-06 NOTE — PLAN OF CARE
Problem: Goal Outcome Summary  Goal: Goal Outcome Summary  Discharge Planner OT   Patient plan for discharge: Home with family assist  Current status: Pt completed 5 L LE exercises (pronation/supination, wrist flexion/extension, radial/ulnar deviation, finger flexion/extension and elbow flexion/extension) x 20 reps each, all completed through full range with exception of elbow flexion, which was limited by ace bandage. OT educated pt in one-handed dressing techniques, pt declined practicing techniques, verbalized understanding and reported all UE dressing related questions have been answered.  Barriers to return to prior living situation: None anticipated  Recommendations for discharge: Home with assist   Rationale for recommendations: Pt will benefit from assistance upon return home to maximize safety and independence in ADLs/IADLs while simultaneously limiting pain.       Entered by: Shelbi Rivers 08/06/2017 8:19 AM     Occupational Therapy Discharge Summary     Reason for therapy discharge:    All goals and outcomes met, no further needs identified.     Progress towards therapy goal(s). See goals on Care Plan in [facility] electronic health record for goal details.  Goals met Pt is adequate for discharge, as she will have assistance in ADLs/IADLs as needed upon return home.     Therapy recommendation(s):    Continue home exercise program.

## 2017-08-06 NOTE — PLAN OF CARE
Problem: Goal Outcome Summary  Goal: Goal Outcome Summary  Outcome: No Change  Afebrile. Applied NC @ 2LPM when sats dropped below 90, but now satting in 90s again on room air. IS was performed x2 when awake. Left elbow pain controlled with scheduled Tylenol and PRN oxycodone x2. Left arm elevated on pillows, ice applied, dressings clean dry and intact. Mild swelling still present in hand, CMS intact. Continue with unasyn. Pt appeared to sleep comfortably between cares. Drinking and voiding adequately.  Will continue to monitor and provide for needs.

## 2017-08-06 NOTE — PROGRESS NOTES
Orthopedics Progress Note    61-year old female s/p left elbow arthroscopy, irrigation and debridement. She states she has mildly increased pain this morning. She notes her elbow was not supported through part of the night. She denies other concerns.     Afebrile.  The elbow shows arthroscopy portals to be well-approximated. Bite wound show packing in place with no significant erythema. Draining is mild and appropriate. Gentle passive range of motion of the elbow causes minimal pain.     Plan is to discharge home once oral antibiotic regimen can be put in place. Follow-up per Dr. Stewart.     Ganesh Rios PA-C

## 2017-08-07 LAB
BACTERIA SPEC CULT: NO GROWTH
BACTERIA SPEC CULT: NO GROWTH
Lab: NORMAL
Lab: NORMAL
MICRO REPORT STATUS: NORMAL
MICRO REPORT STATUS: NORMAL
SPECIMEN SOURCE: NORMAL
SPECIMEN SOURCE: NORMAL

## 2017-08-08 LAB
BACTERIA SPEC CULT: NO GROWTH
MICRO REPORT STATUS: NORMAL
SPECIMEN SOURCE: NORMAL

## 2017-08-10 LAB
BACTERIA SPEC CULT: ABNORMAL
Lab: ABNORMAL
MICRO REPORT STATUS: ABNORMAL
SPECIMEN SOURCE: ABNORMAL

## 2017-08-17 LAB
BACTERIA SPEC CULT: NORMAL
Lab: NORMAL
SPECIMEN SOURCE: NORMAL

## 2019-08-29 ENCOUNTER — HOSPITAL ENCOUNTER (OUTPATIENT)
Dept: LAB | Facility: CLINIC | Age: 64
End: 2019-08-29
Attending: INTERNAL MEDICINE
Payer: COMMERCIAL

## 2019-08-29 ENCOUNTER — HOSPITAL ENCOUNTER (OUTPATIENT)
Dept: RESPIRATORY THERAPY | Facility: CLINIC | Age: 64
Discharge: HOME | End: 2019-08-29
Attending: INTERNAL MEDICINE | Admitting: INTERNAL MEDICINE
Payer: COMMERCIAL

## 2019-08-29 DIAGNOSIS — Z01.818 PRE-OP TESTING: Primary | ICD-10-CM

## 2019-08-29 DIAGNOSIS — C34.90 LUNG CANCER (H): ICD-10-CM

## 2019-08-29 DIAGNOSIS — Z01.818 PRE-OP TESTING: ICD-10-CM

## 2019-08-29 LAB
DLCOCOR-%PRED-PRE: 66 %
DLCOCOR-PRE: 12.49 ML/MIN/MMHG
DLCOUNC-%PRED-PRE: 64 %
DLCOUNC-PRE: 12.08 ML/MIN/MMHG
DLCOUNC-PRED: 18.66 ML/MIN/MMHG
ERV-%PRED-PRE: 101 %
ERV-PRE: 1.14 L
ERV-PRED: 1.12 L
EXPTIME-PRE: 7.49 SEC
FEF2575-%PRED-PRE: 70 %
FEF2575-PRE: 1.44 L/SEC
FEF2575-PRED: 2.05 L/SEC
FEFMAX-%PRED-PRE: 100 %
FEFMAX-PRE: 5.84 L/SEC
FEFMAX-PRED: 5.82 L/SEC
FEV1-%PRED-PRE: 91 %
FEV1-PRE: 2.06 L
FEV1FEV6-PRE: 74 %
FEV1FEV6-PRED: 80 %
FEV1FVC-PRE: 74 %
FEV1FVC-PRED: 79 %
FEV1SVC-PRE: 71 %
FEV1SVC-PRED: 76 %
FIFMAX-PRE: 4.09 L/SEC
FRCPLETH-%PRED-PRE: 119 %
FRCPLETH-PRE: 3.08 L
FRCPLETH-PRED: 2.59 L
FVC-%PRED-PRE: 97 %
FVC-PRE: 2.79 L
FVC-PRED: 2.86 L
GAW-%PRED-PRE: 53 %
GAW-PRE: 0.55 L/S/CMH2O
GAW-PRED: 1.03 L/S/CMH2O
HGB BLD-MCNC: 12.4 G/DL (ref 11.7–15.7)
IC-%PRED-PRE: 96 %
IC-PRE: 1.77 L
IC-PRED: 1.83 L
RVPLETH-%PRED-PRE: 104 %
RVPLETH-PRE: 1.94 L
RVPLETH-PRED: 1.86 L
SGAW-%PRED-PRE: 173 %
SGAW-PRE: 0.18 1/CMH2O*S
SGAW-PRED: 0.1 1/CMH2O*S
SRAW-%PRED-PRE: 118 %
SRAW-PRE: 5.65 CMH2O*S
SRAW-PRED: 4.76 CMH2O*S
TLCPLETH-%PRED-PRE: 105 %
TLCPLETH-PRE: 4.85 L
TLCPLETH-PRED: 4.6 L
VA-%PRED-PRE: 97 %
VA-PRE: 4.3 L
VC-%PRED-PRE: 98 %
VC-PRE: 2.91 L
VC-PRED: 2.95 L

## 2019-08-29 PROCEDURE — 94010 BREATHING CAPACITY TEST: CPT

## 2019-08-29 PROCEDURE — 94726 PLETHYSMOGRAPHY LUNG VOLUMES: CPT

## 2019-08-29 PROCEDURE — 94729 DIFFUSING CAPACITY: CPT

## 2019-08-29 PROCEDURE — 85018 HEMOGLOBIN: CPT | Performed by: INTERNAL MEDICINE

## 2019-08-29 NOTE — PROGRESS NOTES
PFT Note: Patient completed pulmonary function testing with spirometry, lung volumes and diffusion. Good patient effort and cooperation. The results of this test met the ATS standards for acceptability and repeatability.

## 2019-08-30 NOTE — PROCEDURES
Procedure Date: 2019      Please see medical chart for graphs and statistics related to this report.       REFERRING PHYSICIAN:   Dale Tucker                                        TECHNICIAN:   Eloina Jamil      DIAGNOSIS:   Pre-op for Lung Cancer                                                                 HEIGHT:   62.00 inches                                                                    WEIGHT:   96.00 Lbs.      DYSPNEA:   No dyspnea                                                                COUGH:   No cough   WHEEZE:   No wheeze                                                                      TOBACCO PROD:   Cigarette   YEARS SMOKED:   45.0                                                     PKS/DAY:   1.0   YEARS QUIT:                                                                   POST-TEST COMMENTS:   Good patient effort and cooperation.  The results of testing meet ATS criteria for acceptability and repeatability.         INTERPRETATION:      1.  Normal spirometry   2.  Normal lung volumes   3.  Mildly reduced diffusing capacity   4.  Normal FV loop         JESSICA LUND MD             D: 2019   T: 2019   MT: KARIE      Name:     JUAN AMANDA   MRN:      [id number]        Account:        [id number]   :      1955           Procedure Date: 2019      Document: [id number]       cc: Dale CARRILLO

## 2019-10-30 ENCOUNTER — APPOINTMENT (OUTPATIENT)
Age: 64
Setting detail: DERMATOLOGY
End: 2019-11-03

## 2019-10-30 VITALS — WEIGHT: 105 LBS | RESPIRATION RATE: 14 BRPM | HEIGHT: 62 IN

## 2019-10-30 DIAGNOSIS — D49.2 NEOPLASM OF UNSPECIFIED BEHAVIOR OF BONE, SOFT TISSUE, AND SKIN: ICD-10-CM

## 2019-10-30 PROBLEM — D48.5 NEOPLASM OF UNCERTAIN BEHAVIOR OF SKIN: Status: ACTIVE | Noted: 2019-10-30

## 2019-10-30 PROCEDURE — OTHER PATHOLOGY BILLING: OTHER

## 2019-10-30 PROCEDURE — OTHER COUNSELING: OTHER

## 2019-10-30 PROCEDURE — 11102 TANGNTL BX SKIN SINGLE LES: CPT

## 2019-10-30 PROCEDURE — OTHER BIOPSY BY SHAVE METHOD: OTHER

## 2019-10-30 PROCEDURE — 99213 OFFICE O/P EST LOW 20 MIN: CPT | Mod: 25

## 2019-10-30 PROCEDURE — 88305 TISSUE EXAM BY PATHOLOGIST: CPT

## 2019-10-30 ASSESSMENT — LOCATION SIMPLE DESCRIPTION DERM: LOCATION SIMPLE: RIGHT THIGH

## 2019-10-30 ASSESSMENT — LOCATION DETAILED DESCRIPTION DERM: LOCATION DETAILED: RIGHT ANTERIOR PROXIMAL THIGH

## 2019-10-30 ASSESSMENT — LOCATION ZONE DERM: LOCATION ZONE: LEG

## 2019-10-30 NOTE — PROCEDURE: PATHOLOGY BILLING
Immunohistochemistry ([id number] and [id number]) billing is not performed here. Please use the Immunohistochemistry Stain Billing plan to accomplish this. Immunohistochemistry ([id number] and [id number]) billing is not performed here. Please use the Immunohistochemistry Stain Billing plan to accomplish this.

## 2019-10-30 NOTE — PROCEDURE: BIOPSY BY SHAVE METHOD
Consent: Written consent was obtained and risks were reviewed including but not limited to scarring, infection, bleeding, scabbing, incomplete removal, nerve damage and allergy to anesthesia.
Electrodesiccation And Curettage Text: The wound bed was treated with electrodesiccation and curettage after the biopsy was performed.
Biopsy Type: H and E
Cryotherapy Text: The wound bed was treated with cryotherapy after the biopsy was performed.
Detail Level: Detailed
Hemostasis: Drysol
Biopsy Method: Dermablade
Depth Of Biopsy: dermis
Billing Type: Third-Party Bill
Destruction After The Procedure: No
Size Of Lesion In Cm: 0
Dressing: bandage
Electrodesiccation Text: The wound bed was treated with electrodesiccation after the biopsy was performed.
Anesthesia Type: 1% lidocaine without epinephrine
Notification Instructions: Patient will be notified of biopsy results. However, patient instructed to call the office if not contacted within 2 weeks.
Post-Care Instructions: I reviewed with the patient in detail post-care instructions. Patient is to keep the biopsy site dry overnight, and then apply vaseline or Aquaphor with a new bandaid daily until healed.
Type Of Destruction Used: Curettage
Silver Nitrate Text: The wound bed was treated with silver nitrate after the biopsy was performed.
Anesthesia Volume In Cc (Will Not Render If 0): 0.4
Curettage Text: The wound bed was treated with curettage after the biopsy was performed.
Wound Care: Petrolatum
Render Post-Care Instructions In Note?: yes

## 2020-04-21 ENCOUNTER — OFFICE VISIT (OUTPATIENT)
Dept: URGENT CARE | Facility: URGENT CARE | Age: 65
End: 2020-04-21
Payer: COMMERCIAL

## 2020-04-21 ENCOUNTER — ANCILLARY PROCEDURE (OUTPATIENT)
Dept: GENERAL RADIOLOGY | Facility: CLINIC | Age: 65
End: 2020-04-21
Attending: FAMILY MEDICINE
Payer: COMMERCIAL

## 2020-04-21 VITALS
SYSTOLIC BLOOD PRESSURE: 152 MMHG | DIASTOLIC BLOOD PRESSURE: 89 MMHG | WEIGHT: 119 LBS | RESPIRATION RATE: 20 BRPM | BODY MASS INDEX: 21.09 KG/M2 | TEMPERATURE: 98.8 F | OXYGEN SATURATION: 100 % | HEIGHT: 63 IN | HEART RATE: 73 BPM

## 2020-04-21 DIAGNOSIS — R03.0 ELEVATED BLOOD PRESSURE READING WITHOUT DIAGNOSIS OF HYPERTENSION: ICD-10-CM

## 2020-04-21 DIAGNOSIS — R07.81 RIB PAIN ON LEFT SIDE: ICD-10-CM

## 2020-04-21 DIAGNOSIS — R07.81 RIB PAIN ON LEFT SIDE: Primary | ICD-10-CM

## 2020-04-21 PROCEDURE — 99203 OFFICE O/P NEW LOW 30 MIN: CPT | Performed by: FAMILY MEDICINE

## 2020-04-21 PROCEDURE — 71101 X-RAY EXAM UNILAT RIBS/CHEST: CPT | Mod: LT

## 2020-04-21 RX ORDER — TRAMADOL HYDROCHLORIDE 50 MG/1
50 TABLET ORAL EVERY 6 HOURS PRN
Qty: 7 TABLET | Refills: 0 | Status: SHIPPED | OUTPATIENT
Start: 2020-04-21 | End: 2020-04-24

## 2020-04-21 RX ORDER — METOPROLOL TARTRATE 100 MG
100 TABLET ORAL
COMMUNITY
Start: 2020-03-11

## 2020-04-21 RX ORDER — BUPROPION HYDROCHLORIDE 150 MG/1
150 TABLET ORAL
COMMUNITY
Start: 2020-03-10

## 2020-04-21 RX ORDER — ACETAMINOPHEN 325 MG/1
325-650 TABLET ORAL EVERY 6 HOURS PRN
Qty: 30 TABLET | Refills: 0 | Status: SHIPPED | OUTPATIENT
Start: 2020-04-21

## 2020-04-21 RX ORDER — CYCLOBENZAPRINE HCL 10 MG
10 TABLET ORAL
COMMUNITY
Start: 2019-09-12

## 2020-04-21 RX ORDER — IBUPROFEN 600 MG/1
600 TABLET, FILM COATED ORAL EVERY 6 HOURS PRN
Qty: 30 TABLET | Refills: 0 | Status: SHIPPED | OUTPATIENT
Start: 2020-04-21

## 2020-04-21 ASSESSMENT — PAIN SCALES - GENERAL: PAINLEVEL: EXTREME PAIN (8)

## 2020-04-21 ASSESSMENT — MIFFLIN-ST. JEOR: SCORE: 1063.91

## 2020-04-21 NOTE — PROGRESS NOTES
"Subjective:   Sarah Ulrich is a 64 year old female who presents for   Chief Complaint   Patient presents with     Urgent Care     Fall     fell onto a wooden table and injured her left side      Patient fell 3 days ago at home against table, her left side has been painful. Hurts to take a deep breath but no reported dizziness. No rapid breathing.     Has experienced bruised ribs , actually 2 months ago- she took about 2 weeks to recover from this. Was treatd with tramadol, tylenol, ibuprofen. Denies any other prescribed controlled substances in the last couple months other than the tramadol     Reports she did not injure her neck. Patient lives with 3 other individuals. She did not pass out.     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       No reported osteoporosis.     Patient Active Problem List    Diagnosis Date Noted     Cellulitis 08/01/2017     Priority: Medium       Current Outpatient Medications   Medication     acetaminophen (TYLENOL) 325 MG tablet     buPROPion (WELLBUTRIN XL) 150 MG 24 hr tablet     cyclobenzaprine (FLEXERIL) 10 MG tablet     FLUoxetine (PROZAC) 20 MG capsule     ibuprofen (ADVIL/MOTRIN) 600 MG tablet     lisinopril (PRINIVIL/ZESTRIL) 10 MG tablet     metoprolol tartrate (LOPRESSOR) 100 MG tablet     omeprazole (PRILOSEC) 20 MG DR capsule     sertraline (ZOLOFT) 100 MG tablet     traMADol (ULTRAM) 50 MG tablet     gabapentin (NEURONTIN) 300 MG capsule     oxyCODONE (ROXICODONE) 5 MG IR tablet     No current facility-administered medications for this visit.      ROS:  As above per HPI    Objective:   BP (!) 152/89 (BP Location: Left arm, Patient Position: Sitting, Cuff Size: Adult Regular)   Pulse 73   Temp 98.8  F (37.1  C) (Tympanic)   Resp 20   Ht 1.6 m (5' 3\")   Wt 54 kg (119 lb)   SpO2 100%   BMI 21.08 kg/m  , Body mass index is 21.08 kg/m .  Gen:  NAD, well-nourished, sitting in chair comfortably  HEENT: " EOMI, sclera anicteric, Head normocephalic, ; nares patent; moist mucous membranes  Neck: trachea midline, no thyromegaly  CV:  Hemodynamically stable   Pulm:  no increased work of breathing , able to speak fully in sentences without needing to catch breath  L ribs: no bruising of the skin, discomfort in axillary line at ribs 8-10 (moderate discomfort, does not withdraw due to pain)   Extrem: no cyanosis, edema or clubbing  Skin: no obvious rashes or abnormalities  Psych: Euthymic, linear thoughts, normal rate of speech    Results for orders placed or performed in visit on 04/21/20   XR Ribs & Chest Left G/E 3 Views     Status: None    Narrative    RIBS AND CHEST LEFT THREE VIEW   4/21/2020 3:40 PM     HISTORY: Axillae discomfort from fall 3 days ago approximately ribs  8-10. History of partial lung removal. Normal o2 saturation. Rib pain  on left side.    COMPARISON: None.    FINDINGS: There are small right and small-to-moderate left-sided  pleural fluid collections. Heart is normal in size. No pneumothorax is  identified. No definite infiltrate. No acute fracture is seen.  Specifically no evidence for left lower rib fracture at the site  marked with a BB, is seen. There are thoracic aortic calcifications.  Degenerative changes of the spine are partially imaged.      Impression    IMPRESSION:  1. Tiny right and small to moderate-sized left pleural fluid  collections.  2. No acute fracture or pneumothorax. Specifically no evidence for  left rib fracture.  3. Thoracic aortic atherosclerotic calcifications.  4. Degenerative changes in the spine.  5. Radiopacities in the region of the urinary collecting systems  bilaterally could represent recent prior contrast injection although  that study was not done on the [facility] system.    XR    LIZANDRO COTTRELL MD       XR Ribs L:  no acute fractures no pneumothorax per my read    Assessment & Plan:   Sarah Ulrich, 64 year old female who presents with:    Rib pain on left  side  Stable vitals, Normal xray. Presumed contusion of ribs. Ibuprofen/tylenol as needed for moderate discomfort. Tramadol PRN for severe pain. Set expectations this may take weeks to improve. F/u as needed If no improvement in next 2-3 weeks.     - XR Ribs & Chest Left G/E 3 Views  - traMADol (ULTRAM) 50 MG tablet  Dispense: 7 tablet; Refill: 0  - ibuprofen (ADVIL/MOTRIN) 600 MG tablet  Dispense: 30 tablet; Refill: 0  - acetaminophen (TYLENOL) 325 MG tablet  Dispense: 30 tablet; Refill: 0    Elevated blood pressure reading without diagnosis of hypertension  Likely related to discomfort. Continue to monitor at future appointments.     Best contact for updates [phone]      Toni Henao MD   [facility]    The use of Dragon/Mailpile dictation services may have been used to construct the content in this note; any grammatical or spelling errors are non-intentional. Please contact the author of this note directly if you are in need of any clarification.

## 2020-04-21 NOTE — PATIENT INSTRUCTIONS
Ibuprofen 600mg and or tylenol 325-650mg as needed for pain every 4-6 hours  For severe pain, use the tramadol (caution for dizziness/drowsiness)     Expect improvement over the next 2-3 weeks    If pain gets worse or if you are short of breath return right away to be seen

## 2020-09-29 ENCOUNTER — APPOINTMENT (OUTPATIENT)
Dept: URBAN - METROPOLITAN AREA CLINIC 253 | Age: 65
Setting detail: DERMATOLOGY
End: 2020-09-29

## 2020-09-29 VITALS — RESPIRATION RATE: 14 BRPM | HEIGHT: 55 IN | WEIGHT: 120 LBS

## 2020-09-29 DIAGNOSIS — L20.89 OTHER ATOPIC DERMATITIS: ICD-10-CM

## 2020-09-29 DIAGNOSIS — L82.0 INFLAMED SEBORRHEIC KERATOSIS: ICD-10-CM

## 2020-09-29 PROBLEM — L20.84 INTRINSIC (ALLERGIC) ECZEMA: Status: ACTIVE | Noted: 2020-09-29

## 2020-09-29 PROBLEM — D48.5 NEOPLASM OF UNCERTAIN BEHAVIOR OF SKIN: Status: ACTIVE | Noted: 2020-09-29

## 2020-09-29 PROCEDURE — 17110 DESTRUCT B9 LESION 1-14: CPT

## 2020-09-29 PROCEDURE — OTHER PATHOLOGY BILLING: OTHER

## 2020-09-29 PROCEDURE — OTHER COUNSELING: OTHER

## 2020-09-29 PROCEDURE — 11102 TANGNTL BX SKIN SINGLE LES: CPT | Mod: 59

## 2020-09-29 PROCEDURE — OTHER BIOPSY BY SHAVE METHOD: OTHER

## 2020-09-29 PROCEDURE — 88305 TISSUE EXAM BY PATHOLOGIST: CPT

## 2020-09-29 PROCEDURE — OTHER ADDITIONAL NOTES: OTHER

## 2020-09-29 PROCEDURE — OTHER PRESCRIPTION: OTHER

## 2020-09-29 PROCEDURE — 99214 OFFICE O/P EST MOD 30 MIN: CPT | Mod: 25

## 2020-09-29 PROCEDURE — OTHER LIQUID NITROGEN: OTHER

## 2020-09-29 PROCEDURE — 11103 TANGNTL BX SKIN EA SEP/ADDL: CPT | Mod: 59

## 2020-09-29 RX ORDER — TRIAMCINOLONE ACETONIDE 1 MG/G
0.1% CREAM TOPICAL BID
Qty: 1 | Refills: 0 | Status: ERX | COMMUNITY
Start: 2020-09-29

## 2020-09-29 ASSESSMENT — LOCATION SIMPLE DESCRIPTION DERM
LOCATION SIMPLE: LEFT UPPER BACK
LOCATION SIMPLE: LEFT THIGH
LOCATION SIMPLE: CHEST

## 2020-09-29 ASSESSMENT — LOCATION DETAILED DESCRIPTION DERM
LOCATION DETAILED: LEFT ANTERIOR DISTAL THIGH
LOCATION DETAILED: UPPER STERNUM
LOCATION DETAILED: LEFT SUPERIOR MEDIAL UPPER BACK

## 2020-09-29 ASSESSMENT — LOCATION ZONE DERM
LOCATION ZONE: TRUNK
LOCATION ZONE: LEG

## 2020-09-29 NOTE — PROCEDURE: PATHOLOGY BILLING
Rendering Text In Billing: The slides will be read by [employer] and reported in the attached document. Rendering Text In Billing: The slides will be read by [employer] and reported in the attached document.

## 2020-09-29 NOTE — PROCEDURE: COUNSELING
Patient Specific Counseling (Will Not Stick From Patient To Patient): Recommended a topical steroid.
Patient Specific Counseling (Will Not Stick From Patient To Patient): Offered to treat with LN2.
Detail Level: Zone
Detail Level: Detailed

## 2020-09-29 NOTE — PROCEDURE: PATHOLOGY BILLING
Immunohistochemistry ([id number] and [id number]) billing is not performed here. Please use the Immunohistochemistry Stain Billing plan to accomplish this.

## 2020-09-29 NOTE — PROCEDURE: LIQUID NITROGEN
Include Z78.9 (Other Specified Conditions Influencing Health Status) As An Associated Diagnosis?: No
Duration Of Freeze Thaw-Cycle (Seconds): 3
Medical Necessity Clause: This procedure was medically necessary because the lesions that were treated were:
Medical Necessity Information: It is in your best interest to select a reason for this procedure from the list below. All of these items fulfill various CMS LCD requirements except the new and changing color options.
Consent: The patient's consent was obtained including but not limited to risks of crusting, scabbing, blistering, scarring, darker or lighter pigmentary change, recurrence, incomplete removal and infection.
Render Post-Care Instructions In Note?: yes
Number Of Freeze-Thaw Cycles: 2 freeze-thaw cycles
Detail Level: Detailed
Post-Care Instructions: I reviewed with the patient in detail post-care instructions. Patient is to wear sunprotection, and avoid picking at any of the treated lesions. Pt may apply Vaseline to crusted or scabbing areas.

## 2020-09-29 NOTE — PROCEDURE: ADDITIONAL NOTES
Additional Notes: Recommended FBE\\nA female nurse was present for the exam. Care instructions of treated and biopsied sites were explained to the patient in detail. Told patient to call with any concerns or questions. The patient verbalized understanding and agreement of the education provided and the treatment plan. Encouraged patient to schedule a follow up appointment right after visit. At the end of the visit, all questions had been answered and the patient was satisfied with the visit.
Detail Level: Simple

## 2020-09-29 NOTE — HPI: EVALUATION OF SKIN LESION(S)
What Type Of Note Output Would You Prefer (Optional)?: Bullet Format
How Severe Are Your Spot(S)?: moderate
Have Your Spot(S) Been Treated In The Past?: has not been treated
Hpi Title: Evaluation of Skin Lesions
Additional History: Had a spot biopsied on her leg that needs follow up. She couldn’t get it treated due to insurance

## 2020-09-29 NOTE — PROCEDURE: PATHOLOGY BILLING
Immunohistochemistry ([id number] and [id number]) billing is not performed here. Please use the Immunohistochemistry Stain Billing plan to accomplish this. Immunohistochemistry ([id number] and [id number]) billing is not performed here. Please use the Immunohistochemistry Stain Billing plan to accomplish this.

## 2020-11-12 ENCOUNTER — APPOINTMENT (OUTPATIENT)
Dept: URBAN - METROPOLITAN AREA CLINIC 253 | Age: 65
Setting detail: DERMATOLOGY
End: 2020-11-16

## 2020-11-12 VITALS — WEIGHT: 107.6 LBS | RESPIRATION RATE: 14 BRPM | HEIGHT: 63 IN

## 2020-11-12 PROBLEM — D48.5 NEOPLASM OF UNCERTAIN BEHAVIOR OF SKIN: Status: ACTIVE | Noted: 2020-11-12

## 2020-11-12 PROCEDURE — 88305 TISSUE EXAM BY PATHOLOGIST: CPT

## 2020-11-12 PROCEDURE — 11602 EXC TR-EXT MAL+MARG 1.1-2 CM: CPT

## 2020-11-12 PROCEDURE — OTHER PATHOLOGY BILLING: OTHER

## 2020-11-12 PROCEDURE — 12032 INTMD RPR S/A/T/EXT 2.6-7.5: CPT | Mod: 59

## 2020-11-12 PROCEDURE — 11102 TANGNTL BX SKIN SINGLE LES: CPT | Mod: 59

## 2020-11-12 PROCEDURE — OTHER EXCISION: OTHER

## 2020-11-12 PROCEDURE — OTHER BIOPSY BY SHAVE METHOD: OTHER

## 2020-11-12 NOTE — PROCEDURE: BIOPSY BY SHAVE METHOD
Render In Bullet Format When Appropriate: No
Consent: Written consent was obtained and risks were reviewed including but not limited to scarring, infection, bleeding, scabbing, incomplete removal, nerve damage and allergy to anesthesia.
Biopsy Method: Dermablade
Detail Level: Detailed
Size Of Lesion In Cm: 0
Anesthesia Volume In Cc (Will Not Render If 0): 0.4
Cryotherapy Text: The wound bed was treated with cryotherapy after the biopsy was performed.
Hemostasis: Drysol
Anesthesia Type: 2% lidocaine with epinephrine and a 1:10 solution of 8.4% sodium bicarbonate
Was A Bandage Applied: Yes
Dressing: bandage
Curettage Text: The wound bed was treated with curettage after the biopsy was performed.
Notification Instructions: Patient will be notified of biopsy results. However, patient instructed to call the office if not contacted within 2 weeks.
Silver Nitrate Text: The wound bed was treated with silver nitrate after the biopsy was performed.
Type Of Destruction Used: Curettage
Depth Of Biopsy: dermis
Biopsy Type: H and E
Electrodesiccation And Curettage Text: The wound bed was treated with electrodesiccation and curettage after the biopsy was performed.
Post-Care Instructions: I reviewed with the patient in detail post-care instructions. Patient is to keep the biopsy site dry overnight, and then apply bacitracin twice daily until healed. Patient may apply hydrogen peroxide soaks to remove any crusting.
Billing Type: Client Bill
Wound Care: Petrolatum
Electrodesiccation Text: The wound bed was treated with electrodesiccation after the biopsy was performed.
Information: Selecting Yes will display possible errors in your note based on the variables you have selected. This validation is only offered as a suggestion for you. PLEASE NOTE THAT THE VALIDATION TEXT WILL BE REMOVED WHEN YOU FINALIZE YOUR NOTE. IF YOU WANT TO FAX A PRELIMINARY NOTE YOU WILL NEED TO TOGGLE THIS TO 'NO' IF YOU DO NOT WANT IT IN YOUR FAXED NOTE.

## 2020-11-12 NOTE — PROCEDURE: PATHOLOGY BILLING
Immunohistochemistry ([id number] and [id number]) billing is not performed here. Please use the Immunohistochemistry Stain Billing plan to accomplish this. Immunohistochemistry ([id number] and [id number]) billing is not performed here. Please use the Immunohistochemistry Stain Billing plan to accomplish this.

## 2020-11-25 ENCOUNTER — APPOINTMENT (OUTPATIENT)
Dept: URBAN - METROPOLITAN AREA CLINIC 253 | Age: 65
Setting detail: DERMATOLOGY
End: 2020-11-25

## 2020-11-25 DIAGNOSIS — Z48.02 ENCOUNTER FOR REMOVAL OF SUTURES: ICD-10-CM

## 2020-11-25 PROCEDURE — OTHER COUNSELING: OTHER

## 2020-11-25 ASSESSMENT — LOCATION ZONE DERM: LOCATION ZONE: ARM

## 2020-11-25 ASSESSMENT — LOCATION DETAILED DESCRIPTION DERM: LOCATION DETAILED: LEFT DORSAL WRIST

## 2020-11-25 ASSESSMENT — LOCATION SIMPLE DESCRIPTION DERM: LOCATION SIMPLE: LEFT WRIST

## 2022-12-17 ENCOUNTER — LAB REQUISITION (OUTPATIENT)
Dept: LAB | Facility: CLINIC | Age: 67
End: 2022-12-17
Payer: COMMERCIAL

## 2022-12-17 DIAGNOSIS — R79.9 ABNORMAL FINDING OF BLOOD CHEMISTRY, UNSPECIFIED: ICD-10-CM

## 2022-12-17 DIAGNOSIS — R68.89 OTHER GENERAL SYMPTOMS AND SIGNS: ICD-10-CM

## 2022-12-20 LAB
ANION GAP SERPL CALCULATED.3IONS-SCNC: 8 MMOL/L (ref 7–15)
BUN SERPL-MCNC: 13.2 MG/DL (ref 8–23)
CALCIUM SERPL-MCNC: 8 MG/DL (ref 8.8–10.2)
CHLORIDE SERPL-SCNC: 102 MMOL/L (ref 98–107)
CREAT SERPL-MCNC: 0.8 MG/DL (ref 0.51–0.95)
DEPRECATED HCO3 PLAS-SCNC: 28 MMOL/L (ref 22–29)
ERYTHROCYTE [DISTWIDTH] IN BLOOD BY AUTOMATED COUNT: 14.3 % (ref 10–15)
GFR SERPL CREATININE-BSD FRML MDRD: 81 ML/MIN/1.73M2
GLUCOSE SERPL-MCNC: 123 MG/DL (ref 70–99)
HCT VFR BLD AUTO: 27.8 % (ref 35–47)
HGB BLD-MCNC: 8.7 G/DL (ref 11.7–15.7)
MCH RBC QN AUTO: 29.8 PG (ref 26.5–33)
MCHC RBC AUTO-ENTMCNC: 31.3 G/DL (ref 31.5–36.5)
MCV RBC AUTO: 95 FL (ref 78–100)
PLATELET # BLD AUTO: 335 10E3/UL (ref 150–450)
POTASSIUM SERPL-SCNC: 3.4 MMOL/L (ref 3.4–5.3)
RBC # BLD AUTO: 2.92 10E6/UL (ref 3.8–5.2)
SODIUM SERPL-SCNC: 138 MMOL/L (ref 136–145)
WBC # BLD AUTO: 6.8 10E3/UL (ref 4–11)

## 2022-12-20 PROCEDURE — 80048 BASIC METABOLIC PNL TOTAL CA: CPT | Mod: ORL | Performed by: INTERNAL MEDICINE

## 2022-12-20 PROCEDURE — 36415 COLL VENOUS BLD VENIPUNCTURE: CPT | Mod: ORL | Performed by: INTERNAL MEDICINE

## 2022-12-20 PROCEDURE — P9604 ONE-WAY ALLOW PRORATED TRIP: HCPCS | Mod: ORL | Performed by: INTERNAL MEDICINE

## 2022-12-20 PROCEDURE — 85027 COMPLETE CBC AUTOMATED: CPT | Mod: ORL | Performed by: INTERNAL MEDICINE

## 2022-12-25 ENCOUNTER — LAB REQUISITION (OUTPATIENT)
Dept: LAB | Facility: CLINIC | Age: 67
End: 2022-12-25
Payer: COMMERCIAL

## 2022-12-25 DIAGNOSIS — E87.6 HYPOKALEMIA: ICD-10-CM

## 2022-12-27 LAB — POTASSIUM SERPL-SCNC: 4.5 MMOL/L (ref 3.4–5.3)

## 2022-12-27 PROCEDURE — 36415 COLL VENOUS BLD VENIPUNCTURE: CPT | Mod: ORL | Performed by: NURSE PRACTITIONER

## 2022-12-27 PROCEDURE — P9604 ONE-WAY ALLOW PRORATED TRIP: HCPCS | Mod: ORL | Performed by: NURSE PRACTITIONER

## 2022-12-27 PROCEDURE — 84132 ASSAY OF SERUM POTASSIUM: CPT | Mod: ORL | Performed by: NURSE PRACTITIONER

## 2022-12-28 ENCOUNTER — LAB REQUISITION (OUTPATIENT)
Dept: LAB | Facility: CLINIC | Age: 67
End: 2022-12-28
Payer: COMMERCIAL

## 2022-12-28 DIAGNOSIS — D64.89 OTHER SPECIFIED ANEMIAS: ICD-10-CM

## 2022-12-29 LAB
CREAT SERPL-MCNC: 0.67 MG/DL (ref 0.51–0.95)
DAT POLY: NEGATIVE
FOLATE SERPL-MCNC: 18.4 NG/ML (ref 4.6–34.8)
GFR SERPL CREATININE-BSD FRML MDRD: >90 ML/MIN/1.73M2
IRON BINDING CAPACITY (ROCHE): 184 UG/DL (ref 240–430)
IRON SATN MFR SERPL: 25 % (ref 15–46)
IRON SERPL-MCNC: 46 UG/DL (ref 37–145)
RETICS # AUTO: 0.07 10E6/UL (ref 0.03–0.1)
RETICS/RBC NFR AUTO: 2.7 % (ref 0.5–2)
SPECIMEN EXPIRATION DATE: NORMAL
VIT B12 SERPL-MCNC: 541 PG/ML (ref 232–1245)

## 2022-12-29 PROCEDURE — 82746 ASSAY OF FOLIC ACID SERUM: CPT | Mod: ORL | Performed by: INTERNAL MEDICINE

## 2022-12-29 PROCEDURE — 36415 COLL VENOUS BLD VENIPUNCTURE: CPT | Mod: ORL | Performed by: INTERNAL MEDICINE

## 2022-12-29 PROCEDURE — P9604 ONE-WAY ALLOW PRORATED TRIP: HCPCS | Mod: ORL | Performed by: INTERNAL MEDICINE

## 2022-12-29 PROCEDURE — 86880 COOMBS TEST DIRECT: CPT | Mod: ORL | Performed by: INTERNAL MEDICINE

## 2022-12-29 PROCEDURE — 83550 IRON BINDING TEST: CPT | Mod: ORL | Performed by: INTERNAL MEDICINE

## 2022-12-29 PROCEDURE — 82565 ASSAY OF CREATININE: CPT | Mod: ORL | Performed by: INTERNAL MEDICINE

## 2022-12-29 PROCEDURE — 85045 AUTOMATED RETICULOCYTE COUNT: CPT | Mod: ORL | Performed by: INTERNAL MEDICINE

## 2022-12-29 PROCEDURE — 82607 VITAMIN B-12: CPT | Mod: ORL | Performed by: INTERNAL MEDICINE

## 2023-01-02 ENCOUNTER — LAB REQUISITION (OUTPATIENT)
Dept: LAB | Facility: CLINIC | Age: 68
End: 2023-01-02
Payer: COMMERCIAL

## 2023-01-02 DIAGNOSIS — D64.89 OTHER SPECIFIED ANEMIAS: ICD-10-CM

## 2023-01-02 DIAGNOSIS — I10 ESSENTIAL (PRIMARY) HYPERTENSION: ICD-10-CM

## 2023-01-02 LAB
ANION GAP SERPL CALCULATED.3IONS-SCNC: 12 MMOL/L (ref 7–15)
BUN SERPL-MCNC: 12.7 MG/DL (ref 8–23)
CALCIUM SERPL-MCNC: 8.8 MG/DL (ref 8.8–10.2)
CHLORIDE SERPL-SCNC: 101 MMOL/L (ref 98–107)
CREAT SERPL-MCNC: 0.67 MG/DL (ref 0.51–0.95)
DEPRECATED HCO3 PLAS-SCNC: 25 MMOL/L (ref 22–29)
ERYTHROCYTE [DISTWIDTH] IN BLOOD BY AUTOMATED COUNT: 15.4 % (ref 10–15)
GFR SERPL CREATININE-BSD FRML MDRD: >90 ML/MIN/1.73M2
GLUCOSE SERPL-MCNC: 86 MG/DL (ref 70–99)
HCT VFR BLD AUTO: 28.5 % (ref 35–47)
HGB BLD-MCNC: 8.7 G/DL (ref 11.7–15.7)
MCH RBC QN AUTO: 30.3 PG (ref 26.5–33)
MCHC RBC AUTO-ENTMCNC: 30.5 G/DL (ref 31.5–36.5)
MCV RBC AUTO: 99 FL (ref 78–100)
PLATELET # BLD AUTO: 248 10E3/UL (ref 150–450)
POTASSIUM SERPL-SCNC: 3.7 MMOL/L (ref 3.4–5.3)
RBC # BLD AUTO: 2.87 10E6/UL (ref 3.8–5.2)
SODIUM SERPL-SCNC: 138 MMOL/L (ref 136–145)
WBC # BLD AUTO: 6.3 10E3/UL (ref 4–11)

## 2023-01-02 PROCEDURE — 80048 BASIC METABOLIC PNL TOTAL CA: CPT | Mod: ORL | Performed by: NURSE PRACTITIONER

## 2023-01-02 PROCEDURE — 36415 COLL VENOUS BLD VENIPUNCTURE: CPT | Mod: ORL | Performed by: NURSE PRACTITIONER

## 2023-01-02 PROCEDURE — 85027 COMPLETE CBC AUTOMATED: CPT | Mod: ORL | Performed by: NURSE PRACTITIONER

## 2023-01-02 PROCEDURE — P9603 ONE-WAY ALLOW PRORATED MILES: HCPCS | Mod: ORL | Performed by: NURSE PRACTITIONER

## 2023-01-04 ENCOUNTER — LAB REQUISITION (OUTPATIENT)
Dept: LAB | Facility: CLINIC | Age: 68
End: 2023-01-04
Payer: COMMERCIAL

## 2023-01-04 DIAGNOSIS — D64.89 OTHER SPECIFIED ANEMIAS: ICD-10-CM

## 2023-01-04 DIAGNOSIS — I10 ESSENTIAL (PRIMARY) HYPERTENSION: ICD-10-CM

## 2023-01-06 LAB
ANION GAP SERPL CALCULATED.3IONS-SCNC: 12 MMOL/L (ref 7–15)
BUN SERPL-MCNC: 12.4 MG/DL (ref 8–23)
CALCIUM SERPL-MCNC: 9.4 MG/DL (ref 8.8–10.2)
CHLORIDE SERPL-SCNC: 102 MMOL/L (ref 98–107)
CREAT SERPL-MCNC: 0.68 MG/DL (ref 0.51–0.95)
DEPRECATED HCO3 PLAS-SCNC: 24 MMOL/L (ref 22–29)
ERYTHROCYTE [DISTWIDTH] IN BLOOD BY AUTOMATED COUNT: 15.6 % (ref 10–15)
GFR SERPL CREATININE-BSD FRML MDRD: >90 ML/MIN/1.73M2
GLUCOSE SERPL-MCNC: 72 MG/DL (ref 70–99)
HCT VFR BLD AUTO: 27.8 % (ref 35–47)
HGB BLD-MCNC: 8.8 G/DL (ref 11.7–15.7)
MCH RBC QN AUTO: 30.2 PG (ref 26.5–33)
MCHC RBC AUTO-ENTMCNC: 31.7 G/DL (ref 31.5–36.5)
MCV RBC AUTO: 96 FL (ref 78–100)
PLATELET # BLD AUTO: 302 10E3/UL (ref 150–450)
POTASSIUM SERPL-SCNC: 4.3 MMOL/L (ref 3.4–5.3)
RBC # BLD AUTO: 2.91 10E6/UL (ref 3.8–5.2)
SODIUM SERPL-SCNC: 138 MMOL/L (ref 136–145)
WBC # BLD AUTO: 6.1 10E3/UL (ref 4–11)

## 2023-01-06 PROCEDURE — P9603 ONE-WAY ALLOW PRORATED MILES: HCPCS | Mod: ORL | Performed by: NURSE PRACTITIONER

## 2023-01-06 PROCEDURE — 80048 BASIC METABOLIC PNL TOTAL CA: CPT | Mod: ORL | Performed by: NURSE PRACTITIONER

## 2023-01-06 PROCEDURE — 85027 COMPLETE CBC AUTOMATED: CPT | Mod: ORL | Performed by: NURSE PRACTITIONER

## 2023-01-06 PROCEDURE — 36415 COLL VENOUS BLD VENIPUNCTURE: CPT | Mod: ORL | Performed by: NURSE PRACTITIONER

## 2023-02-07 ENCOUNTER — LAB REQUISITION (OUTPATIENT)
Dept: LAB | Facility: CLINIC | Age: 68
End: 2023-02-07
Payer: COMMERCIAL

## 2023-02-07 DIAGNOSIS — D64.89 OTHER SPECIFIED ANEMIAS: ICD-10-CM

## 2023-02-07 DIAGNOSIS — I10 ESSENTIAL (PRIMARY) HYPERTENSION: ICD-10-CM

## 2023-02-07 DIAGNOSIS — E87.6 HYPOKALEMIA: ICD-10-CM

## 2023-02-08 LAB
ANION GAP SERPL CALCULATED.3IONS-SCNC: 12 MMOL/L (ref 7–15)
BASOPHILS # BLD AUTO: 0.1 10E3/UL (ref 0–0.2)
BASOPHILS NFR BLD AUTO: 1 %
BUN SERPL-MCNC: 13.6 MG/DL (ref 8–23)
CALCIUM SERPL-MCNC: 9 MG/DL (ref 8.8–10.2)
CHLORIDE SERPL-SCNC: 102 MMOL/L (ref 98–107)
CREAT SERPL-MCNC: 0.7 MG/DL (ref 0.51–0.95)
DEPRECATED HCO3 PLAS-SCNC: 25 MMOL/L (ref 22–29)
EOSINOPHIL # BLD AUTO: 0.4 10E3/UL (ref 0–0.7)
EOSINOPHIL NFR BLD AUTO: 7 %
ERYTHROCYTE [DISTWIDTH] IN BLOOD BY AUTOMATED COUNT: 13.8 % (ref 10–15)
GFR SERPL CREATININE-BSD FRML MDRD: >90 ML/MIN/1.73M2
GLUCOSE SERPL-MCNC: 84 MG/DL (ref 70–99)
HCT VFR BLD AUTO: 28.9 % (ref 35–47)
HGB BLD-MCNC: 9.2 G/DL (ref 11.7–15.7)
IMM GRANULOCYTES # BLD: 0 10E3/UL
IMM GRANULOCYTES NFR BLD: 1 %
LYMPHOCYTES # BLD AUTO: 1.5 10E3/UL (ref 0.8–5.3)
LYMPHOCYTES NFR BLD AUTO: 24 %
MCH RBC QN AUTO: 29.7 PG (ref 26.5–33)
MCHC RBC AUTO-ENTMCNC: 31.8 G/DL (ref 31.5–36.5)
MCV RBC AUTO: 93 FL (ref 78–100)
MONOCYTES # BLD AUTO: 0.7 10E3/UL (ref 0–1.3)
MONOCYTES NFR BLD AUTO: 10 %
NEUTROPHILS # BLD AUTO: 3.8 10E3/UL (ref 1.6–8.3)
NEUTROPHILS NFR BLD AUTO: 57 %
NRBC # BLD AUTO: 0 10E3/UL
NRBC BLD AUTO-RTO: 0 /100
PLATELET # BLD AUTO: 325 10E3/UL (ref 150–450)
POTASSIUM SERPL-SCNC: 4 MMOL/L (ref 3.4–5.3)
RBC # BLD AUTO: 3.1 10E6/UL (ref 3.8–5.2)
SODIUM SERPL-SCNC: 139 MMOL/L (ref 136–145)
WBC # BLD AUTO: 6.5 10E3/UL (ref 4–11)

## 2023-02-08 PROCEDURE — P9604 ONE-WAY ALLOW PRORATED TRIP: HCPCS | Mod: ORL | Performed by: NURSE PRACTITIONER

## 2023-02-08 PROCEDURE — 80048 BASIC METABOLIC PNL TOTAL CA: CPT | Mod: ORL | Performed by: NURSE PRACTITIONER

## 2023-02-08 PROCEDURE — 36415 COLL VENOUS BLD VENIPUNCTURE: CPT | Mod: ORL | Performed by: NURSE PRACTITIONER

## 2023-02-08 PROCEDURE — 85025 COMPLETE CBC W/AUTO DIFF WBC: CPT | Mod: ORL | Performed by: NURSE PRACTITIONER

## 2023-02-09 ENCOUNTER — LAB REQUISITION (OUTPATIENT)
Dept: LAB | Facility: CLINIC | Age: 68
End: 2023-02-09
Payer: COMMERCIAL

## 2023-02-09 DIAGNOSIS — E78.49 OTHER HYPERLIPIDEMIA: ICD-10-CM

## 2023-02-10 LAB
ALT SERPL W P-5'-P-CCNC: 13 U/L (ref 10–35)
CHOLEST SERPL-MCNC: 130 MG/DL
HDLC SERPL-MCNC: 61 MG/DL
LDLC SERPL CALC-MCNC: 53 MG/DL
NONHDLC SERPL-MCNC: 69 MG/DL
TRIGL SERPL-MCNC: 81 MG/DL

## 2023-02-10 PROCEDURE — P9604 ONE-WAY ALLOW PRORATED TRIP: HCPCS | Mod: ORL | Performed by: INTERNAL MEDICINE

## 2023-02-10 PROCEDURE — 36415 COLL VENOUS BLD VENIPUNCTURE: CPT | Mod: ORL | Performed by: INTERNAL MEDICINE

## 2023-02-10 PROCEDURE — 80061 LIPID PANEL: CPT | Mod: ORL | Performed by: INTERNAL MEDICINE

## 2023-02-10 PROCEDURE — 84460 ALANINE AMINO (ALT) (SGPT): CPT | Mod: ORL | Performed by: INTERNAL MEDICINE

## 2023-03-13 ENCOUNTER — LAB REQUISITION (OUTPATIENT)
Dept: LAB | Facility: CLINIC | Age: 68
End: 2023-03-13
Payer: COMMERCIAL

## 2023-03-13 DIAGNOSIS — R41.89 OTHER SYMPTOMS AND SIGNS INVOLVING COGNITIVE FUNCTIONS AND AWARENESS: ICD-10-CM

## 2023-03-14 ENCOUNTER — LAB REQUISITION (OUTPATIENT)
Dept: LAB | Facility: CLINIC | Age: 68
End: 2023-03-14
Payer: MEDICARE

## 2023-03-14 DIAGNOSIS — N39.0 URINARY TRACT INFECTION, SITE NOT SPECIFIED: ICD-10-CM

## 2023-03-14 LAB
ALBUMIN UR-MCNC: 20 MG/DL
ANION GAP SERPL CALCULATED.3IONS-SCNC: 13 MMOL/L (ref 7–15)
APPEARANCE UR: ABNORMAL
BACTERIA #/AREA URNS HPF: ABNORMAL /HPF
BASOPHILS # BLD AUTO: 0 10E3/UL (ref 0–0.2)
BASOPHILS NFR BLD AUTO: 0 %
BILIRUB UR QL STRIP: NEGATIVE
BUN SERPL-MCNC: 21.7 MG/DL (ref 8–23)
CALCIUM SERPL-MCNC: 9.1 MG/DL (ref 8.8–10.2)
CHLORIDE SERPL-SCNC: 100 MMOL/L (ref 98–107)
COLOR UR AUTO: YELLOW
CREAT SERPL-MCNC: 0.73 MG/DL (ref 0.51–0.95)
DEPRECATED HCO3 PLAS-SCNC: 27 MMOL/L (ref 22–29)
EOSINOPHIL # BLD AUTO: 0 10E3/UL (ref 0–0.7)
EOSINOPHIL NFR BLD AUTO: 0 %
ERYTHROCYTE [DISTWIDTH] IN BLOOD BY AUTOMATED COUNT: 13.1 % (ref 10–15)
GFR SERPL CREATININE-BSD FRML MDRD: 90 ML/MIN/1.73M2
GLUCOSE SERPL-MCNC: 89 MG/DL (ref 70–99)
GLUCOSE UR STRIP-MCNC: NEGATIVE MG/DL
HCT VFR BLD AUTO: 33.8 % (ref 35–47)
HGB BLD-MCNC: 10.3 G/DL (ref 11.7–15.7)
HGB UR QL STRIP: ABNORMAL
HYALINE CASTS: 11 /LPF
IMM GRANULOCYTES # BLD: 0 10E3/UL
IMM GRANULOCYTES NFR BLD: 0 %
KETONES UR STRIP-MCNC: NEGATIVE MG/DL
LEUKOCYTE ESTERASE UR QL STRIP: ABNORMAL
LYMPHOCYTES # BLD AUTO: 1.5 10E3/UL (ref 0.8–5.3)
LYMPHOCYTES NFR BLD AUTO: 13 %
MCH RBC QN AUTO: 29.1 PG (ref 26.5–33)
MCHC RBC AUTO-ENTMCNC: 30.5 G/DL (ref 31.5–36.5)
MCV RBC AUTO: 96 FL (ref 78–100)
MONOCYTES # BLD AUTO: 0.7 10E3/UL (ref 0–1.3)
MONOCYTES NFR BLD AUTO: 7 %
MUCOUS THREADS #/AREA URNS LPF: PRESENT /LPF
NEUTROPHILS # BLD AUTO: 9 10E3/UL (ref 1.6–8.3)
NEUTROPHILS NFR BLD AUTO: 80 %
NITRATE UR QL: POSITIVE
NRBC # BLD AUTO: 0 10E3/UL
NRBC BLD AUTO-RTO: 0 /100
PH UR STRIP: 5.5 [PH] (ref 5–7)
PLATELET # BLD AUTO: 251 10E3/UL (ref 150–450)
POTASSIUM SERPL-SCNC: 3.8 MMOL/L (ref 3.4–5.3)
RBC # BLD AUTO: 3.54 10E6/UL (ref 3.8–5.2)
RBC URINE: 2 /HPF
SODIUM SERPL-SCNC: 140 MMOL/L (ref 136–145)
SP GR UR STRIP: 1.02 (ref 1–1.03)
SQUAMOUS EPITHELIAL: 6 /HPF
UROBILINOGEN UR STRIP-MCNC: NORMAL MG/DL
WBC # BLD AUTO: 11.3 10E3/UL (ref 4–11)
WBC URINE: 11 /HPF

## 2023-03-14 PROCEDURE — 36415 COLL VENOUS BLD VENIPUNCTURE: CPT | Mod: ORL | Performed by: NURSE PRACTITIONER

## 2023-03-14 PROCEDURE — 81001 URINALYSIS AUTO W/SCOPE: CPT | Mod: ORL | Performed by: NURSE PRACTITIONER

## 2023-03-14 PROCEDURE — 85025 COMPLETE CBC W/AUTO DIFF WBC: CPT | Mod: ORL | Performed by: NURSE PRACTITIONER

## 2023-03-14 PROCEDURE — 80048 BASIC METABOLIC PNL TOTAL CA: CPT | Mod: ORL | Performed by: NURSE PRACTITIONER

## 2023-03-14 PROCEDURE — 87086 URINE CULTURE/COLONY COUNT: CPT | Mod: ORL | Performed by: NURSE PRACTITIONER

## 2023-03-14 PROCEDURE — P9604 ONE-WAY ALLOW PRORATED TRIP: HCPCS | Mod: ORL | Performed by: NURSE PRACTITIONER

## 2023-03-16 LAB
BACTERIA UR CULT: ABNORMAL
BACTERIA UR CULT: ABNORMAL

## 2023-05-24 ENCOUNTER — LAB REQUISITION (OUTPATIENT)
Dept: LAB | Facility: CLINIC | Age: 68
End: 2023-05-24
Payer: COMMERCIAL

## 2023-05-24 DIAGNOSIS — D64.89 OTHER SPECIFIED ANEMIAS: ICD-10-CM

## 2023-05-24 DIAGNOSIS — I10 ESSENTIAL (PRIMARY) HYPERTENSION: ICD-10-CM

## 2023-05-24 DIAGNOSIS — E87.6 HYPOKALEMIA: ICD-10-CM

## 2023-05-25 LAB
ANION GAP SERPL CALCULATED.3IONS-SCNC: 10 MMOL/L (ref 7–15)
BASOPHILS # BLD AUTO: 0.1 10E3/UL (ref 0–0.2)
BASOPHILS NFR BLD AUTO: 1 %
BUN SERPL-MCNC: 20.4 MG/DL (ref 8–23)
CALCIUM SERPL-MCNC: 9 MG/DL (ref 8.8–10.2)
CHLORIDE SERPL-SCNC: 99 MMOL/L (ref 98–107)
CREAT SERPL-MCNC: 0.8 MG/DL (ref 0.51–0.95)
DEPRECATED HCO3 PLAS-SCNC: 25 MMOL/L (ref 22–29)
EOSINOPHIL # BLD AUTO: 0.5 10E3/UL (ref 0–0.7)
EOSINOPHIL NFR BLD AUTO: 10 %
ERYTHROCYTE [DISTWIDTH] IN BLOOD BY AUTOMATED COUNT: 14.7 % (ref 10–15)
GFR SERPL CREATININE-BSD FRML MDRD: 81 ML/MIN/1.73M2
GLUCOSE SERPL-MCNC: 114 MG/DL (ref 70–99)
HCT VFR BLD AUTO: 31.8 % (ref 35–47)
HGB BLD-MCNC: 9.8 G/DL (ref 11.7–15.7)
IMM GRANULOCYTES # BLD: 0 10E3/UL
IMM GRANULOCYTES NFR BLD: 0 %
LYMPHOCYTES # BLD AUTO: 1.7 10E3/UL (ref 0.8–5.3)
LYMPHOCYTES NFR BLD AUTO: 32 %
MCH RBC QN AUTO: 28.4 PG (ref 26.5–33)
MCHC RBC AUTO-ENTMCNC: 30.8 G/DL (ref 31.5–36.5)
MCV RBC AUTO: 92 FL (ref 78–100)
MONOCYTES # BLD AUTO: 0.6 10E3/UL (ref 0–1.3)
MONOCYTES NFR BLD AUTO: 11 %
NEUTROPHILS # BLD AUTO: 2.6 10E3/UL (ref 1.6–8.3)
NEUTROPHILS NFR BLD AUTO: 46 %
NRBC # BLD AUTO: 0 10E3/UL
NRBC BLD AUTO-RTO: 0 /100
PLATELET # BLD AUTO: 234 10E3/UL (ref 150–450)
POTASSIUM SERPL-SCNC: 4.1 MMOL/L (ref 3.4–5.3)
RBC # BLD AUTO: 3.45 10E6/UL (ref 3.8–5.2)
SODIUM SERPL-SCNC: 134 MMOL/L (ref 136–145)
WBC # BLD AUTO: 5.5 10E3/UL (ref 4–11)

## 2023-05-25 PROCEDURE — 85025 COMPLETE CBC W/AUTO DIFF WBC: CPT | Mod: ORL | Performed by: NURSE PRACTITIONER

## 2023-05-25 PROCEDURE — P9604 ONE-WAY ALLOW PRORATED TRIP: HCPCS | Mod: ORL | Performed by: NURSE PRACTITIONER

## 2023-05-25 PROCEDURE — 80048 BASIC METABOLIC PNL TOTAL CA: CPT | Mod: ORL | Performed by: NURSE PRACTITIONER

## 2023-05-25 PROCEDURE — 36415 COLL VENOUS BLD VENIPUNCTURE: CPT | Mod: ORL | Performed by: NURSE PRACTITIONER

## 2023-07-23 NOTE — PROCEDURE: EXCISION
1.82 Transposition Flap Text: The defect edges were debeveled with a #15 scalpel blade.  Given the location of the defect and the proximity to free margins a transposition flap was deemed most appropriate.  Using a sterile surgical marker, an appropriate transposition flap was drawn incorporating the defect.    The area thus outlined was incised deep to adipose tissue with a #15 scalpel blade.  The skin margins were undermined to an appropriate distance in all directions utilizing iris scissors.

## 2023-11-29 ENCOUNTER — LAB REQUISITION (OUTPATIENT)
Dept: LAB | Facility: CLINIC | Age: 68
End: 2023-11-29
Payer: COMMERCIAL

## 2023-11-29 DIAGNOSIS — E78.2 MIXED HYPERLIPIDEMIA: ICD-10-CM

## 2023-11-29 DIAGNOSIS — D64.9 ANEMIA, UNSPECIFIED: ICD-10-CM

## 2023-11-30 LAB
ALBUMIN SERPL BCG-MCNC: 3.8 G/DL (ref 3.5–5.2)
ALP SERPL-CCNC: 82 U/L (ref 40–150)
ALT SERPL W P-5'-P-CCNC: 13 U/L (ref 0–50)
ANION GAP SERPL CALCULATED.3IONS-SCNC: 12 MMOL/L (ref 7–15)
AST SERPL W P-5'-P-CCNC: 21 U/L (ref 0–45)
BILIRUB SERPL-MCNC: 0.2 MG/DL
BUN SERPL-MCNC: 17.7 MG/DL (ref 8–23)
CALCIUM SERPL-MCNC: 8.9 MG/DL (ref 8.8–10.2)
CHLORIDE SERPL-SCNC: 104 MMOL/L (ref 98–107)
CHOLEST SERPL-MCNC: 119 MG/DL
CREAT SERPL-MCNC: 0.78 MG/DL (ref 0.51–0.95)
DEPRECATED HCO3 PLAS-SCNC: 24 MMOL/L (ref 22–29)
EGFRCR SERPLBLD CKD-EPI 2021: 83 ML/MIN/1.73M2
ERYTHROCYTE [DISTWIDTH] IN BLOOD BY AUTOMATED COUNT: 13.4 % (ref 10–15)
GLUCOSE SERPL-MCNC: 175 MG/DL (ref 70–99)
HCT VFR BLD AUTO: 33.5 % (ref 35–47)
HDLC SERPL-MCNC: 47 MG/DL
HGB BLD-MCNC: 10.6 G/DL (ref 11.7–15.7)
LDLC SERPL CALC-MCNC: 49 MG/DL
MCH RBC QN AUTO: 30 PG (ref 26.5–33)
MCHC RBC AUTO-ENTMCNC: 31.6 G/DL (ref 31.5–36.5)
MCV RBC AUTO: 95 FL (ref 78–100)
NONHDLC SERPL-MCNC: 72 MG/DL
PLATELET # BLD AUTO: 187 10E3/UL (ref 150–450)
POTASSIUM SERPL-SCNC: 4.2 MMOL/L (ref 3.4–5.3)
PROT SERPL-MCNC: 6.1 G/DL (ref 6.4–8.3)
RBC # BLD AUTO: 3.53 10E6/UL (ref 3.8–5.2)
SODIUM SERPL-SCNC: 140 MMOL/L (ref 135–145)
TRIGL SERPL-MCNC: 114 MG/DL
WBC # BLD AUTO: 4.5 10E3/UL (ref 4–11)

## 2023-11-30 PROCEDURE — 80053 COMPREHEN METABOLIC PANEL: CPT | Mod: ORL | Performed by: NURSE PRACTITIONER

## 2023-11-30 PROCEDURE — P9603 ONE-WAY ALLOW PRORATED MILES: HCPCS | Mod: ORL | Performed by: NURSE PRACTITIONER

## 2023-11-30 PROCEDURE — 36415 COLL VENOUS BLD VENIPUNCTURE: CPT | Mod: ORL | Performed by: NURSE PRACTITIONER

## 2023-11-30 PROCEDURE — 85027 COMPLETE CBC AUTOMATED: CPT | Mod: ORL | Performed by: NURSE PRACTITIONER

## 2023-11-30 PROCEDURE — 80061 LIPID PANEL: CPT | Mod: ORL | Performed by: NURSE PRACTITIONER

## 2024-01-24 ENCOUNTER — LAB REQUISITION (OUTPATIENT)
Dept: LAB | Facility: CLINIC | Age: 69
End: 2024-01-24
Payer: COMMERCIAL

## 2024-01-24 DIAGNOSIS — R73.09 OTHER ABNORMAL GLUCOSE: ICD-10-CM

## 2024-01-25 LAB — HBA1C MFR BLD: 6 %

## 2024-01-25 PROCEDURE — 83036 HEMOGLOBIN GLYCOSYLATED A1C: CPT | Performed by: NURSE PRACTITIONER

## 2024-01-25 PROCEDURE — 36415 COLL VENOUS BLD VENIPUNCTURE: CPT | Performed by: NURSE PRACTITIONER

## 2024-01-25 PROCEDURE — P9604 ONE-WAY ALLOW PRORATED TRIP: HCPCS | Performed by: NURSE PRACTITIONER

## 2024-05-22 ENCOUNTER — LAB REQUISITION (OUTPATIENT)
Dept: LAB | Facility: CLINIC | Age: 69
End: 2024-05-22
Payer: COMMERCIAL

## 2024-05-22 DIAGNOSIS — F32.4 MAJOR DEPRESSIVE DISORDER, SINGLE EPISODE, IN PARTIAL REMISSION (H): ICD-10-CM

## 2024-05-22 DIAGNOSIS — E78.2 MIXED HYPERLIPIDEMIA: ICD-10-CM

## 2024-05-22 DIAGNOSIS — I48.91 UNSPECIFIED ATRIAL FIBRILLATION (H): ICD-10-CM

## 2024-05-22 DIAGNOSIS — R73.03 PREDIABETES: ICD-10-CM

## 2024-05-22 DIAGNOSIS — E55.9 VITAMIN D DEFICIENCY, UNSPECIFIED: ICD-10-CM

## 2024-05-23 LAB
BASOPHILS # BLD AUTO: 0.1 10E3/UL (ref 0–0.2)
BASOPHILS NFR BLD AUTO: 1 %
EOSINOPHIL # BLD AUTO: 0.3 10E3/UL (ref 0–0.7)
EOSINOPHIL NFR BLD AUTO: 5 %
ERYTHROCYTE [DISTWIDTH] IN BLOOD BY AUTOMATED COUNT: 14 % (ref 10–15)
HBA1C MFR BLD: 5.9 %
HCT VFR BLD AUTO: 33 % (ref 35–47)
HGB BLD-MCNC: 10.5 G/DL (ref 11.7–15.7)
IMM GRANULOCYTES # BLD: 0 10E3/UL
IMM GRANULOCYTES NFR BLD: 0 %
LYMPHOCYTES # BLD AUTO: 1.4 10E3/UL (ref 0.8–5.3)
LYMPHOCYTES NFR BLD AUTO: 26 %
MCH RBC QN AUTO: 29.7 PG (ref 26.5–33)
MCHC RBC AUTO-ENTMCNC: 31.8 G/DL (ref 31.5–36.5)
MCV RBC AUTO: 93 FL (ref 78–100)
MONOCYTES # BLD AUTO: 0.4 10E3/UL (ref 0–1.3)
MONOCYTES NFR BLD AUTO: 7 %
NEUTROPHILS # BLD AUTO: 3.4 10E3/UL (ref 1.6–8.3)
NEUTROPHILS NFR BLD AUTO: 61 %
NRBC # BLD AUTO: 0 10E3/UL
NRBC BLD AUTO-RTO: 0 /100
PLATELET # BLD AUTO: 232 10E3/UL (ref 150–450)
RBC # BLD AUTO: 3.54 10E6/UL (ref 3.8–5.2)
WBC # BLD AUTO: 5.6 10E3/UL (ref 4–11)

## 2024-05-23 PROCEDURE — 84443 ASSAY THYROID STIM HORMONE: CPT | Mod: ORL | Performed by: NURSE PRACTITIONER

## 2024-05-23 PROCEDURE — P9604 ONE-WAY ALLOW PRORATED TRIP: HCPCS | Mod: ORL | Performed by: NURSE PRACTITIONER

## 2024-05-23 PROCEDURE — 80053 COMPREHEN METABOLIC PANEL: CPT | Mod: ORL | Performed by: NURSE PRACTITIONER

## 2024-05-23 PROCEDURE — 85025 COMPLETE CBC W/AUTO DIFF WBC: CPT | Mod: ORL | Performed by: NURSE PRACTITIONER

## 2024-05-23 PROCEDURE — 83036 HEMOGLOBIN GLYCOSYLATED A1C: CPT | Mod: ORL | Performed by: NURSE PRACTITIONER

## 2024-05-23 PROCEDURE — 80061 LIPID PANEL: CPT | Mod: ORL | Performed by: NURSE PRACTITIONER

## 2024-05-23 PROCEDURE — 36415 COLL VENOUS BLD VENIPUNCTURE: CPT | Mod: ORL | Performed by: NURSE PRACTITIONER

## 2024-05-24 LAB
ALBUMIN SERPL BCG-MCNC: 4.3 G/DL (ref 3.5–5.2)
ALP SERPL-CCNC: 76 U/L (ref 40–150)
ALT SERPL W P-5'-P-CCNC: 19 U/L (ref 0–50)
ANION GAP SERPL CALCULATED.3IONS-SCNC: 12 MMOL/L (ref 7–15)
AST SERPL W P-5'-P-CCNC: 24 U/L (ref 0–45)
BILIRUB SERPL-MCNC: 0.2 MG/DL
BUN SERPL-MCNC: 18.6 MG/DL (ref 8–23)
CALCIUM SERPL-MCNC: 9.3 MG/DL (ref 8.8–10.2)
CHLORIDE SERPL-SCNC: 99 MMOL/L (ref 98–107)
CHOLEST SERPL-MCNC: 125 MG/DL
CREAT SERPL-MCNC: 0.81 MG/DL (ref 0.51–0.95)
DEPRECATED HCO3 PLAS-SCNC: 22 MMOL/L (ref 22–29)
EGFRCR SERPLBLD CKD-EPI 2021: 79 ML/MIN/1.73M2
FASTING STATUS PATIENT QL REPORTED: NO
GLUCOSE SERPL-MCNC: 163 MG/DL (ref 70–99)
HDLC SERPL-MCNC: 63 MG/DL
LDLC SERPL CALC-MCNC: 45 MG/DL
NONHDLC SERPL-MCNC: 62 MG/DL
POTASSIUM SERPL-SCNC: 4.7 MMOL/L (ref 3.4–5.3)
PROT SERPL-MCNC: 6.3 G/DL (ref 6.4–8.3)
SODIUM SERPL-SCNC: 133 MMOL/L (ref 135–145)
TRIGL SERPL-MCNC: 86 MG/DL
TSH SERPL DL<=0.005 MIU/L-ACNC: 0.96 UIU/ML (ref 0.3–4.2)

## 2025-03-24 ENCOUNTER — APPOINTMENT (OUTPATIENT)
Dept: CT IMAGING | Facility: CLINIC | Age: 70
End: 2025-03-24
Attending: EMERGENCY MEDICINE
Payer: COMMERCIAL

## 2025-03-24 ENCOUNTER — HOSPITAL ENCOUNTER (EMERGENCY)
Facility: CLINIC | Age: 70
Discharge: HOME | End: 2025-03-25
Attending: EMERGENCY MEDICINE | Admitting: EMERGENCY MEDICINE
Payer: COMMERCIAL

## 2025-03-24 DIAGNOSIS — S09.90XA TRAUMATIC INJURY OF HEAD, INITIAL ENCOUNTER: ICD-10-CM

## 2025-03-24 DIAGNOSIS — S01.01XA LACERATION OF SCALP, INITIAL ENCOUNTER: ICD-10-CM

## 2025-03-24 PROCEDURE — 70450 CT HEAD/BRAIN W/O DYE: CPT

## 2025-03-24 PROCEDURE — 99284 EMERGENCY DEPT VISIT MOD MDM: CPT | Mod: 25

## 2025-03-24 PROCEDURE — 12013 RPR F/E/E/N/L/M 2.6-5.0 CM: CPT

## 2025-03-24 PROCEDURE — 72125 CT NECK SPINE W/O DYE: CPT

## 2025-03-24 ASSESSMENT — COLUMBIA-SUICIDE SEVERITY RATING SCALE - C-SSRS
2. HAVE YOU ACTUALLY HAD ANY THOUGHTS OF KILLING YOURSELF IN THE PAST MONTH?: NO
6. HAVE YOU EVER DONE ANYTHING, STARTED TO DO ANYTHING, OR PREPARED TO DO ANYTHING TO END YOUR LIFE?: NO
1. IN THE PAST MONTH, HAVE YOU WISHED YOU WERE DEAD OR WISHED YOU COULD GO TO SLEEP AND NOT WAKE UP?: NO

## 2025-03-24 ASSESSMENT — ACTIVITIES OF DAILY LIVING (ADL): ADLS_ACUITY_SCORE: 45

## 2025-03-25 VITALS
TEMPERATURE: 97.7 F | HEIGHT: 61 IN | DIASTOLIC BLOOD PRESSURE: 70 MMHG | WEIGHT: 115 LBS | RESPIRATION RATE: 18 BRPM | OXYGEN SATURATION: 94 % | SYSTOLIC BLOOD PRESSURE: 153 MMHG | HEART RATE: 58 BPM | BODY MASS INDEX: 21.71 KG/M2

## 2025-03-25 PROCEDURE — 250N000009 HC RX 250: Performed by: EMERGENCY MEDICINE

## 2025-03-25 RX ORDER — LIDOCAINE HYDROCHLORIDE 10 MG/ML
INJECTION, SOLUTION EPIDURAL; INFILTRATION; INTRACAUDAL; PERINEURAL
Status: COMPLETED
Start: 2025-03-25 | End: 2025-03-25

## 2025-03-25 RX ADMIN — LIDOCAINE HYDROCHLORIDE: 10 INJECTION, SOLUTION EPIDURAL; INFILTRATION; INTRACAUDAL; PERINEURAL at 00:41

## 2025-03-25 NOTE — ED PROVIDER NOTES
Emergency Department Note      History of Present Illness     Chief Complaint  Fall and Head Injury (No LOC)    HPI  Sarah Ulrich is a 69 year old female who presents to the emergency room after a mechanical fall at home.  She states very clearly that she over stepped and missed one of the steps in the garage and fell and hit the right front part of her forehead.  She states that she has no neck pain, did not lose consciousness, and was able to get up after the event.  She is on Eliquis, but denies any vomiting, does endorse a headache however.  States that she did not hit any other part of her body to the ground, specifically denies any upper extremity or lower extremity pain, no hip pain and was able to bear weight.      Independent Historian  no    Review of External Notes  NA      Past Medical History   Medical History and Problem List  No past medical history on file.    Medications  acetaminophen (TYLENOL) 325 MG tablet  buPROPion (WELLBUTRIN XL) 150 MG 24 hr tablet  cyclobenzaprine (FLEXERIL) 10 MG tablet  FLUoxetine (PROZAC) 20 MG capsule  gabapentin (NEURONTIN) 300 MG capsule  ibuprofen (ADVIL/MOTRIN) 600 MG tablet  lisinopril (PRINIVIL/ZESTRIL) 10 MG tablet  metoprolol tartrate (LOPRESSOR) 100 MG tablet  omeprazole (PRILOSEC) 20 MG DR capsule  oxyCODONE (ROXICODONE) 5 MG IR tablet  sertraline (ZOLOFT) 100 MG tablet        Surgical History   Past Surgical History:   Procedure Laterality Date    ARTHROSCOPY ELBOW Left 8/3/2017    Procedure: ARTHROSCOPY ELBOW;  1.  Local debridement of skin, fascia and muscle in 2 wounds, left lateral elbow, measuring 2 cm each.   2.  Arthroscopic extensive debridement with complete anterior and posterior synovectomies, left elbow. ;  Surgeon: Roldan Stewart MD;  Location: [facility] OR    BIOPSY      IR LUNG BIOPSY MEDIASTINUM LEFT  8/22/2019    ORTHOPEDIC SURGERY      Left ankle surgery         Physical Exam   Patient Vitals for the past 24 hrs:   BP Temp Temp src Pulse Resp  "SpO2 Height Weight   03/25/25 0040 -- -- -- -- -- -- 1.549 m (5' 1\") 52.2 kg (115 lb)   03/25/25 0030 (!) 153/70 -- -- 58 -- -- -- --   03/24/25 2342 -- -- -- -- -- 94 % -- --   03/24/25 2245 (!) 153/69 -- -- 57 -- -- -- --   03/24/25 2230 (!) 157/70 -- -- 57 -- 97 % -- --   03/24/25 2218 (!) 141/64 97.7  F (36.5  C) Oral 55 18 93 % -- --       Physical Exam  Vitals: reviewed by me  General: Pt seen on [facility], cooperative, and alert to conversation  Eyes: Tracking well, clear conjunctiva BL  ENT: MMM, midline trachea.  Moderate sized contusion noted to right forehead, venous pattern bleeding only, significant amount of dried blood in the hair posteriorly.  Lungs: No tachypnea, no accessory muscle use. No respiratory distress.   CV: Rate as above  MSK: no joint effusion.  No evidence of trauma  Skin: No rash  Neuro: Clear speech and no facial droop.  Moving all extremity spontaneously and following all commands.  Psych: Not RIS, no e/o AH/VH      Diagnostics   Lab Results   Labs Ordered and Resulted from Time of ED Arrival to Time of ED Departure - No data to display    Imaging  CT Cervical Spine w/o Contrast   Final Result   IMPRESSION:   HEAD CT:   1.  No CT evidence for acute intracranial process.   2.  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   3.  Focal scalp hematoma right frontal region.      CERVICAL SPINE CT:   1.  No CT evidence for acute fracture or post traumatic subluxation.   2.  Degenerative changes with central canal and foraminal stenosis as described above.         CT Head w/o Contrast   Final Result   IMPRESSION:   HEAD CT:   1.  No CT evidence for acute intracranial process.   2.  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   3.  Focal scalp hematoma right frontal region.      CERVICAL SPINE CT:   1.  No CT " evidence for acute fracture or post traumatic subluxation.   2.  Degenerative changes with central canal and foraminal stenosis as described above.                     Independent Interpretation  Yes I have independently reviewed the patient's CT scan of the head, no obvious hemorrhage noted      ED Course      Medications Administered   Medications   lidocaine (PF) (XYLOCAINE) 1 % injection (  $Given by Other 3/25/25 9579)          Procedures    Laceration Repair      Procedure: Laceration Repair    Indication: Laceration    Consent: Verbal  Right forehead    Location: Right forehead     Length: 4.5 cm    Preparation: Irrigation with wound cleaning solution.    Anesthesia/Sedation: 1% lidocaine without epinephrine      Treatment/Exploration: Wound explored, no foreign bodies found     Closure: The wound was closed with 5 simple interrupted sutures of 4-0 Ethilon.    Patient Status: The patient tolerated the procedure well: Yes. There were no complications.          Optional/Additional Documentation  Transportation       Medical Decision Making / Diagnosis         MDM  This is a very pleasant 69-year-old female who presents to the emergency room with what appears to be a clear mechanical fall.  She is able to give a great history for this and her mechanism does fit with her injury pattern.  She did get a CT scan of her head and neck and thankfully there is no emergent findings, given the fact that she is still on a blood thinner, we discussed in details what a rebleed or delayed bleed would look like and how the patient should come back to the ER if anything is worse.  She did require several sutures for hemostasis and please see the note above for additional information on this.  She passed her ambulation trial and is tolerating orals and has good support in the form of her 2 staff members that are here at the bedside with her.  We talked about how the stitches will have to come out in 7 days, as well as the need  to follow-up in the next several days with her regular doctor for checkup and when to come back to the ER.  Patient herself is steady on her feet and feels comfortable leaving, I do not think that labs would be particularly helpful as this does seem to be a rather clear mechanical fall.      ICD-10 Codes:    ICD-10-CM    1. Traumatic injury of head, initial encounter  S09.90XA       2. Laceration of scalp, initial encounter  S01.01XA              Discharge Medications  Discharge Medication List as of 3/25/2025 12:39 AM                     Ronald Clifford MD  03/25/25 0244

## 2025-03-25 NOTE — ED TRIAGE NOTES
Per EMS patient was walking into garage and missed a step and fell from stairs to garage floor and hit head, no LOC. Currently taking Eliquis. Denies pain, baseline neuro yes/no questions answers appropriately  Lives in Group Home as resident

## 2025-03-25 NOTE — DISCHARGE INSTRUCTIONS
As we discussed you need to come back to the ER immediately if you have any worsening symptoms like worsening headache, trouble staying awake, or any nausea or vomiting.  Please be certain that your stitches get removed in 7 days time, there are 5 stitches that will be need to be removed, and until then please keep the wound clean and dry and change the bandage every 24 hours as needed.

## (undated) DEVICE — PACKING IODOFORM STRIP 1/2" 7832

## (undated) DEVICE — GLOVE PROTEXIS BLUE W/NEU-THERA 7.0  2D73EB70

## (undated) DEVICE — PREP CHLORAPREP 26ML TINTED ORANGE  260815

## (undated) DEVICE — COVER FOOTSWITCH W/CINCH 20X24" 923267

## (undated) DEVICE — Device

## (undated) DEVICE — DRAIN HEMOVAC RESERVOIR KIT 10FR 1/8" MED 00-2550-002-10

## (undated) DEVICE — KIT CULTURE ESWAB AEROBE/ANAEROBE WHITE TOP R723480

## (undated) DEVICE — APPLICATORS COTTON TIP 6" X2

## (undated) DEVICE — BUR SHAVER ARTHRO 6MM OVAL H9102

## (undated) DEVICE — TUBING ARTHRO CONMED/LINVATEC PUMP BLUE INFLOW 10K100

## (undated) DEVICE — CAST PADDING 4" STERILE 9044S

## (undated) DEVICE — LINEN FULL SHEET 5511

## (undated) DEVICE — LINEN HALF SHEET 5512

## (undated) DEVICE — GLOVE PROTEXIS POWDER FREE 7.5 ORTHOPEDIC 2D73ET75

## (undated) DEVICE — PACK ARTHROSCOPY KNEE

## (undated) DEVICE — TUBING SUCTION 12"X1/4" N612

## (undated) DEVICE — GLOVE PROTEXIS BLUE W/NEU-THERA 7.5  2D73EB75

## (undated) DEVICE — LINEN ORTHO ACL PACK 5447

## (undated) DEVICE — BLADE KNIFE SURG 15 371115

## (undated) DEVICE — DRSG ABDOMINAL 07 1/2X8" 7197D

## (undated) DEVICE — BNDG ELASTIC 4" DBL LENGTH UNSTERILE 6611-14

## (undated) DEVICE — NDL 18GA 1.5" 305196

## (undated) DEVICE — BLADE SHAVER ARTHRO 4.2MM CUDA C9254

## (undated) DEVICE — SU PROLENE 3-0 PS-1 18" 8663G

## (undated) DEVICE — PREP POVIDONE IODINE SOLUTION 10% 120ML

## (undated) DEVICE — LIGHT HANDLE X2

## (undated) DEVICE — DRAPE IOBAN INCISE 23X17" 6650EZ

## (undated) DEVICE — DRSG GAUZE 4X4" 8044

## (undated) DEVICE — ESU GROUND PAD ADULT W/CORD E7507

## (undated) DEVICE — GLOVE PROTEXIS POWDER FREE 7.0 ORTHOPEDIC 2D73ET70

## (undated) DEVICE — GLOVE PROTEXIS POWDER FREE SMT 7.5  2D72PT75X

## (undated) DEVICE — CAST PADDING 4" UNSTERILE 9044

## (undated) DEVICE — BAG CLEAR TRASH 1.3M 39X33" P4040C

## (undated) DEVICE — SYR 50ML SLIP TIP W/O NDL 309654

## (undated) DEVICE — ESU ARTHROWAND 90DEG RF AMBIENT SUPER TURBOVAC ASHA4250-01

## (undated) DEVICE — TOURNIQUET CUFF 12" STERILE

## (undated) DEVICE — SOL NACL 0.9% IRRIG 3000ML BAG 2B7477

## (undated) DEVICE — TUBE CULTURE ANAEROBIC PORT-A-CUL 11ML

## (undated) RX ORDER — LIDOCAINE HYDROCHLORIDE 10 MG/ML
INJECTION, SOLUTION EPIDURAL; INFILTRATION; INTRACAUDAL; PERINEURAL
Status: DISPENSED
Start: 2017-08-03

## (undated) RX ORDER — DEXAMETHASONE SODIUM PHOSPHATE 4 MG/ML
INJECTION, SOLUTION INTRA-ARTICULAR; INTRALESIONAL; INTRAMUSCULAR; INTRAVENOUS; SOFT TISSUE
Status: DISPENSED
Start: 2017-08-03

## (undated) RX ORDER — ONDANSETRON 2 MG/ML
INJECTION INTRAMUSCULAR; INTRAVENOUS
Status: DISPENSED
Start: 2017-08-03

## (undated) RX ORDER — FENTANYL CITRATE 50 UG/ML
INJECTION, SOLUTION INTRAMUSCULAR; INTRAVENOUS
Status: DISPENSED
Start: 2017-08-03

## (undated) RX ORDER — NEOSTIGMINE METHYLSULFATE 5 MG/5 ML
SYRINGE (ML) INTRAVENOUS
Status: DISPENSED
Start: 2017-08-03

## (undated) RX ORDER — GLYCOPYRROLATE 0.2 MG/ML
INJECTION INTRAMUSCULAR; INTRAVENOUS
Status: DISPENSED
Start: 2017-08-03

## (undated) RX ORDER — ACETAMINOPHEN 10 MG/ML
INJECTION, SOLUTION INTRAVENOUS
Status: DISPENSED
Start: 2017-08-03

## (undated) RX ORDER — KETOROLAC TROMETHAMINE 30 MG/ML
INJECTION, SOLUTION INTRAMUSCULAR; INTRAVENOUS
Status: DISPENSED
Start: 2017-08-03